# Patient Record
Sex: MALE | Race: WHITE | NOT HISPANIC OR LATINO | Employment: PART TIME | URBAN - NONMETROPOLITAN AREA
[De-identification: names, ages, dates, MRNs, and addresses within clinical notes are randomized per-mention and may not be internally consistent; named-entity substitution may affect disease eponyms.]

---

## 2019-07-31 ENCOUNTER — APPOINTMENT (OUTPATIENT)
Dept: GENERAL RADIOLOGY | Facility: OTHER | Age: 44
End: 2019-07-31
Attending: FAMILY MEDICINE

## 2019-07-31 ENCOUNTER — HOSPITAL ENCOUNTER (EMERGENCY)
Facility: OTHER | Age: 44
Discharge: HOME | End: 2019-07-31
Attending: FAMILY MEDICINE | Admitting: FAMILY MEDICINE

## 2019-07-31 VITALS
SYSTOLIC BLOOD PRESSURE: 133 MMHG | HEIGHT: 72 IN | OXYGEN SATURATION: 98 % | BODY MASS INDEX: 23.03 KG/M2 | HEART RATE: 82 BPM | DIASTOLIC BLOOD PRESSURE: 74 MMHG | WEIGHT: 170 LBS | RESPIRATION RATE: 16 BRPM | TEMPERATURE: 98.1 F

## 2019-07-31 DIAGNOSIS — R07.81 RIB PAIN ON RIGHT SIDE: ICD-10-CM

## 2019-07-31 PROCEDURE — 99283 EMERGENCY DEPT VISIT LOW MDM: CPT | Performed by: FAMILY MEDICINE

## 2019-07-31 PROCEDURE — 71101 X-RAY EXAM UNILAT RIBS/CHEST: CPT | Mod: RT

## 2019-07-31 PROCEDURE — 99283 EMERGENCY DEPT VISIT LOW MDM: CPT | Mod: Z6 | Performed by: FAMILY MEDICINE

## 2019-07-31 PROCEDURE — 25000132 ZZH RX MED GY IP 250 OP 250 PS 637: Performed by: FAMILY MEDICINE

## 2019-07-31 RX ORDER — TRAMADOL HYDROCHLORIDE 50 MG/1
50 TABLET ORAL EVERY 6 HOURS PRN
Qty: 10 TABLET | Refills: 0 | Status: SHIPPED | OUTPATIENT
Start: 2019-07-31 | End: 2019-08-17

## 2019-07-31 RX ORDER — TRAMADOL HYDROCHLORIDE 50 MG/1
50 TABLET ORAL ONCE
Status: COMPLETED | OUTPATIENT
Start: 2019-07-31 | End: 2019-07-31

## 2019-07-31 RX ADMIN — TRAMADOL HYDROCHLORIDE 50 MG: 50 TABLET, FILM COATED ORAL at 22:19

## 2019-07-31 ASSESSMENT — MIFFLIN-ST. JEOR: SCORE: 1694.11

## 2019-07-31 NOTE — ED AVS SNAPSHOT
[facility]  [address]  [place] [address]  Phone:  [phone]  Fax:  [phone]                                    Job Sloan   MRN: [id number]    Department:  [facility]   Date of Visit:  7/31/2019           After Visit Summary Signature Page    I have received my discharge instructions, and my questions have been answered. I have discussed any challenges I see with this plan with the nurse or doctor.    ..........................................................................................................................................  Patient/Patient Representative Signature      ..........................................................................................................................................  Patient Representative Print Name and Relationship to Patient    ..................................................               ................................................  Date                                   Time    ..........................................................................................................................................  Reviewed by Signature/Title    ...................................................              ..............................................  Date                                               Time          22EPIC Rev 08/18

## 2019-08-01 NOTE — ED PROVIDER NOTES
History     Chief Complaint   Patient presents with     Rib Pain     HPI  Job Sloan is a 43 year old male who stretched yesterday and felt a pop in his right anterior ribcage area.  There was also immediate pain.  It just hasn't gotten any better despite time and ibuprofen.  He is most concerned that he broke a rib.      No SOB.    No vomiting or abdominal concerns.    Allergies:  Allergies   Allergen Reactions     Hydrocodone-Acetaminophen Hives and Itching       Problem List:    Patient Active Problem List    Diagnosis Date Noted     Hyperlipidemia with target LDL less than 160 08/11/2015     Priority: Medium     Bilateral carpal tunnel syndrome 08/11/2015     Priority: Medium        Past Medical History:    History reviewed. No pertinent past medical history.    Past Surgical History:    History reviewed. No pertinent surgical history.    Family History:    History reviewed. No pertinent family history.    Social History:  Marital Status:  Single [1]  Social History     Tobacco Use     Smoking status: Current Every Day Smoker     Smokeless tobacco: Never Used   Substance Use Topics     Alcohol use: No     Drug use: No        Medications:      gabapentin (NEURONTIN) 600 MG tablet   traMADol (ULTRAM) 50 MG tablet   gabapentin (GRALISE) 300 MG 24hr Ext Release tab   NAPROXEN PO         Review of Systems no stated fevers, chills, abdominal pain    Physical Exam   BP: 133/74  Pulse: 82  Temp: 98.1  F (36.7  C)  Resp: 16  Height: 182.9 cm (6')  Weight: 77.1 kg (170 lb)  SpO2: 98 %      Physical Exam  Well man.  Stable vitals.  Heart reg.  Lungs clear.  I can press on his mid-clavicular line near rib 7 or 8 and reproduce symptoms.  He likely tore some cartilaginous structures or an intercostal muscular tear.  Highly doubt PE.  abd soft.    Xray shows no pneumothorax or broken rib.    ED Course        Procedures               Critical Care time:  none               Results for orders placed or performed during  the hospital encounter of 07/31/19 (from the past 24 hour(s))   XR Ribs & Chest Right G/E 3 Views    Narrative    PROCEDURE: XR RIBS & CHEST RT 3VW 7/31/2019 8:33 PM    HISTORY: rib pain    COMPARISONS: None.    TECHNIQUE: Chest 1 view, right RIBS 4 views    FINDINGS: Chest: The lungs are clear the heart and pulmonary vessels  are within normal limits. No pneumothorax or pleural effusion is seen.    Right RIBS: There are no right rib fractures or destructive lesions  noted.         Impression    IMPRESSION: Normal chest and right RIBS    ROMA ELLIS MD       Medications   traMADol (ULTRAM) tablet 50 mg (has no administration in time range)       Assessments & Plan (with Medical Decision Making)     I have reviewed the nursing notes.    I have reviewed the findings, diagnosis, plan and need for follow up with the patient.   patient likely tore some ribcage cartilage or an intercostal muscle.  Tramadol given #10 for pain, first dose here in ER.      Recommend returning to clinic if symptoms do not subside in a timely fashion.           Medication List      Started    traMADol 50 MG tablet  Commonly known as:  ULTRAM  50 mg, Oral, EVERY 6 HOURS PRN            Final diagnoses:   Rib pain on right side       7/31/2019   [facility]     Daniel Choi MD  07/31/19 [id number]

## 2019-08-14 ENCOUNTER — HOSPITAL ENCOUNTER (EMERGENCY)
Facility: OTHER | Age: 44
Discharge: HOME | End: 2019-08-14
Attending: PHYSICIAN ASSISTANT | Admitting: PHYSICIAN ASSISTANT

## 2019-08-14 ENCOUNTER — APPOINTMENT (OUTPATIENT)
Dept: GENERAL RADIOLOGY | Facility: OTHER | Age: 44
End: 2019-08-14
Attending: PHYSICIAN ASSISTANT

## 2019-08-14 VITALS
HEIGHT: 72 IN | SYSTOLIC BLOOD PRESSURE: 147 MMHG | TEMPERATURE: 99.5 F | OXYGEN SATURATION: 99 % | DIASTOLIC BLOOD PRESSURE: 90 MMHG | BODY MASS INDEX: 23.03 KG/M2 | WEIGHT: 170 LBS | RESPIRATION RATE: 16 BRPM

## 2019-08-14 DIAGNOSIS — S92.341A DISPLACED FRACTURE OF FOURTH METATARSAL BONE, RIGHT FOOT, INITIAL ENCOUNTER FOR CLOSED FRACTURE: ICD-10-CM

## 2019-08-14 DIAGNOSIS — M79.671 RIGHT FOOT PAIN: ICD-10-CM

## 2019-08-14 PROCEDURE — 99283 EMERGENCY DEPT VISIT LOW MDM: CPT | Mod: Z6 | Performed by: PHYSICIAN ASSISTANT

## 2019-08-14 PROCEDURE — 99284 EMERGENCY DEPT VISIT MOD MDM: CPT | Mod: 25 | Performed by: PHYSICIAN ASSISTANT

## 2019-08-14 PROCEDURE — 73630 X-RAY EXAM OF FOOT: CPT | Mod: TC,RT

## 2019-08-14 RX ORDER — BACLOFEN 20 MG/1
TABLET ORAL
COMMUNITY
End: 2019-11-20

## 2019-08-14 RX ORDER — IBUPROFEN 800 MG/1
800 TABLET, FILM COATED ORAL
COMMUNITY
Start: 2019-04-15 | End: 2019-11-20 | Stop reason: ALTCHOICE

## 2019-08-14 RX ORDER — GABAPENTIN 300 MG/1
CAPSULE ORAL
COMMUNITY
Start: 2017-11-22 | End: 2019-08-14

## 2019-08-14 ASSESSMENT — ENCOUNTER SYMPTOMS
ADENOPATHY: 0
CHILLS: 0
BRUISES/BLEEDS EASILY: 0
SHORTNESS OF BREATH: 0
ABDOMINAL PAIN: 0
HEMATURIA: 0
CONFUSION: 0
WOUND: 0
CHEST TIGHTNESS: 0
FEVER: 0
BACK PAIN: 0

## 2019-08-14 ASSESSMENT — MIFFLIN-ST. JEOR: SCORE: 1694.11

## 2019-08-14 NOTE — LETTER
August 14, 2019      To Whom It May Concern:      Job Sloan was seen in our Emergency Department today, 08/14/19.  He will need to be off work until 8/19/2019 in order to recover from his injuries and establish care with an orthopedic surgeon.    Sincerely,              Danny Leiva PA-C

## 2019-08-14 NOTE — ED AVS SNAPSHOT
[facility]  [address]  [place] [address]  Phone:  [phone]  Fax:  [phone]                                    Job Sloan   MRN: [id number]    Department:  [facility]   Date of Visit:  8/14/2019           After Visit Summary Signature Page    I have received my discharge instructions, and my questions have been answered. I have discussed any challenges I see with this plan with the nurse or doctor.    ..........................................................................................................................................  Patient/Patient Representative Signature      ..........................................................................................................................................  Patient Representative Print Name and Relationship to Patient    ..................................................               ................................................  Date                                   Time    ..........................................................................................................................................  Reviewed by Signature/Title    ...................................................              ..............................................  Date                                               Time          22EPIC Rev 08/18

## 2019-08-15 NOTE — ED TRIAGE NOTES
"Pt arrives to the ED via private car.  Pt states that he was working and jumped down at the 4th step of a ladder and when landing on his right foot he states it sounded like \"knuckles cracking\" but on his foot.  Pt reports this happening around 1800 tonight.  "

## 2019-08-17 ENCOUNTER — APPOINTMENT (OUTPATIENT)
Dept: GENERAL RADIOLOGY | Facility: OTHER | Age: 44
End: 2019-08-17
Attending: FAMILY MEDICINE

## 2019-08-17 ENCOUNTER — HOSPITAL ENCOUNTER (EMERGENCY)
Facility: OTHER | Age: 44
Discharge: HOME | End: 2019-08-17
Attending: PHYSICIAN ASSISTANT | Admitting: PHYSICIAN ASSISTANT

## 2019-08-17 VITALS
RESPIRATION RATE: 16 BRPM | WEIGHT: 170 LBS | TEMPERATURE: 98.1 F | HEART RATE: 68 BPM | HEIGHT: 72 IN | OXYGEN SATURATION: 99 % | SYSTOLIC BLOOD PRESSURE: 129 MMHG | BODY MASS INDEX: 23.03 KG/M2 | DIASTOLIC BLOOD PRESSURE: 82 MMHG

## 2019-08-17 DIAGNOSIS — Z76.0 ENCOUNTER FOR MEDICATION REFILL: ICD-10-CM

## 2019-08-17 DIAGNOSIS — S92.911A: ICD-10-CM

## 2019-08-17 PROCEDURE — 99283 EMERGENCY DEPT VISIT LOW MDM: CPT | Mod: Z6 | Performed by: PHYSICIAN ASSISTANT

## 2019-08-17 PROCEDURE — 99283 EMERGENCY DEPT VISIT LOW MDM: CPT | Mod: 25 | Performed by: PHYSICIAN ASSISTANT

## 2019-08-17 PROCEDURE — 73630 X-RAY EXAM OF FOOT: CPT | Mod: RT

## 2019-08-17 RX ORDER — OXYCODONE HYDROCHLORIDE 5 MG/1
5 TABLET ORAL EVERY 6 HOURS PRN
Qty: 12 TABLET | Refills: 0 | Status: SHIPPED | OUTPATIENT
Start: 2019-08-17 | End: 2019-11-20

## 2019-08-17 ASSESSMENT — MIFFLIN-ST. JEOR: SCORE: 1694.11

## 2019-08-17 NOTE — ED TRIAGE NOTES
"Patient broke foot on Wednesday, ran out of prescribed pain medication. Was told to come in by pharmacy to be seen for medication refill. Patient reporting he doesn't have health insurance right now, unable to see primary care provider until Tuesday at least. \"More painful today than day it happened\"  "

## 2019-08-17 NOTE — LETTER
August 17, 2019      To Whom It May Concern:      Job Sloan was seen in our Emergency Department today, 08/17/19.  I expect his condition to improve over the next 5 days.  He may return to work and perform activity as tolerated when improved.    Sincerely,        SACHIN Ortiz

## 2019-08-17 NOTE — ED AVS SNAPSHOT
[facility]  [address]  [place] [address]  Phone:  [phone]  Fax:  [phone]                                    Job Sloan   MRN: [id number]    Department:  [facility]   Date of Visit:  8/17/2019           After Visit Summary Signature Page    I have received my discharge instructions, and my questions have been answered. I have discussed any challenges I see with this plan with the nurse or doctor.    ..........................................................................................................................................  Patient/Patient Representative Signature      ..........................................................................................................................................  Patient Representative Print Name and Relationship to Patient    ..................................................               ................................................  Date                                   Time    ..........................................................................................................................................  Reviewed by Signature/Title    ...................................................              ..............................................  Date                                               Time          22EPIC Rev 08/18

## 2019-08-18 NOTE — DISCHARGE INSTRUCTIONS
Get plenty of fluids and rest.  He can take Tylenol and ibuprofen as needed for discomfort.  Also use ice and elevation.  Use your crutches strictly nonweightbearing at this time.  Take your prescribed medication as directed.  Call early Monday morning to make an appointment with orthopedics.  Return to the ED if have worsening or concerning symptoms.

## 2019-08-18 NOTE — ED NOTES
Patient discharged to home by self with discharge instructions, note for work and Rx. Patient verbalizes understanding of instructions

## 2019-08-18 NOTE — ED PROVIDER NOTES
History     Chief Complaint   Patient presents with     Medication Refill     HPI  Job Sloan is a 43 year old male who presents to the emergency department with chief complaint of a need for medication refill.  Patient was seen 3 days prior to the emergency department and diagnosed with fractures in his right toes.  He reports he is out of his pain medication that his pain has increased.  However, he has not been following the advice such as staying off of his foot.  There are no new injuries or trauma that is reported.  Patient has no other complaints.    Allergies:  Allergies   Allergen Reactions     Hydrocodone-Acetaminophen Hives and Itching       Problem List:    Patient Active Problem List    Diagnosis Date Noted     Hyperlipidemia with target LDL less than 160 08/11/2015     Priority: Medium     Bilateral carpal tunnel syndrome 08/11/2015     Priority: Medium        Past Medical History:    History reviewed. No pertinent past medical history.    Past Surgical History:    History reviewed. No pertinent surgical history.    Family History:    History reviewed. No pertinent family history.    Social History:  Marital Status:  Single [1]  Social History     Tobacco Use     Smoking status: Current Every Day Smoker     Packs/day: 0.25     Smokeless tobacco: Never Used   Substance Use Topics     Alcohol use: No     Drug use: Yes     Types: Marijuana     Comment: occasional        Medications:      oxyCODONE (ROXICODONE) 5 MG tablet   baclofen (LIORESAL) 20 MG tablet   gabapentin (GRALISE) 300 MG 24hr Ext Release tab   ibuprofen (ADVIL/MOTRIN) 800 MG tablet   Ipratropium-Albuterol (COMBIVENT RESPIMAT)  MCG/ACT inhaler         Review of Systems   Musculoskeletal:        Right foot pain   All other systems reviewed and are negative.      Physical Exam   BP: (!) 147/79  Pulse: 66  Temp: 98.1  F (36.7  C)  Resp: 16  Height: 182.9 cm (6')  Weight: 77.1 kg (170 lb)  SpO2: 99 %      Physical Exam    Constitutional: He appears well-developed and well-nourished. No distress.   HENT:   Head: Normocephalic and atraumatic.   Eyes: Conjunctivae are normal. No scleral icterus.   Neck: Neck supple.   Cardiovascular: Normal rate and regular rhythm.   Pulmonary/Chest: Effort normal and breath sounds normal.   Abdominal: Soft. There is no tenderness.   Musculoskeletal: He exhibits no deformity.   Bruising tenderness to palpation of right toes and foot   Lymphadenopathy:     He has no cervical adenopathy.   Neurological: He is alert.   Skin: Skin is warm and dry. No rash noted. He is not diaphoretic.   Psychiatric: He has a normal mood and affect.       ED Course        Procedures               Critical Care time:  none               Results for orders placed or performed during the hospital encounter of 08/17/19 (from the past 24 hour(s))   XR Foot Right G/E 3 Views    Narrative    Exam: XR FOOT RT G/E 3 VW     History:Male, age 43 years, fracture    Comparison:  8/14/2019    Technique: Three views are submitted.    Findings: Bones are normally mineralized. Curvilinear lucency seen  previous in the base of the fourth metatarsal is similar in  appearance. No new fracture. No dislocation.           Impression    Impression:  Curvilinear lucency again seen at the base of the fourth metatarsal  suggesting a mildly displaced intra-articular fracture extending into  the fourth tarsometatarsal joint as well as the articulation between  the third and fourth metatarsals. No significant change in the overall  orientation or position of the bony structures.    VAN CALLEJAS MD       Medications - No data to display    Assessments & Plan (with Medical Decision Making)   Patient is nontoxic-appearing in slight distress due to discomfort.  Bruising and swelling to right foot toes.  X-ray showed Curvilinear lucency again seen at the base of the fourth metatarsal  suggesting a mildly displaced intra-articular fracture extending  into  the fourth tarsometatarsal joint as well as the articulation between  the third and fourth metatarsals. No significant change in the overall  orientation or position of the bony structures.    Discussed findings with the patient, we will extend the short course of his pain medication and referral was placed for him to follow-up with orthopedics.  I did let him know that we would not be providing any more pain medication from the emergency room for this current problem.  However, if symptoms are worsening he should return the emergency department for further evaluation.  He understands and agrees with plan and patient is discharged.    Fili Mcmahon PA-C        I have reviewed the nursing notes.    I have reviewed the findings, diagnosis, plan and need for follow up with the patient.       Discharge Medication List as of 8/17/2019  7:15 PM      START taking these medications    Details   oxyCODONE (ROXICODONE) 5 MG tablet Take 1 tablet (5 mg) by mouth every 6 hours as needed for pain, Disp-12 tablet, R-0, Local Print             Final diagnoses:   Fracture of multiple toes, right, closed, initial encounter   Encounter for medication refill       8/17/2019   [facility]     Fili Mcmahon PA  08/17/19 [id number]

## 2019-08-22 ENCOUNTER — OFFICE VISIT (OUTPATIENT)
Dept: ORTHOPEDICS | Facility: OTHER | Age: 44
End: 2019-08-22
Attending: PODIATRIST

## 2019-08-22 DIAGNOSIS — S99.921A INJURY OF RIGHT FOOT, INITIAL ENCOUNTER: Primary | ICD-10-CM

## 2019-08-22 PROCEDURE — G0463 HOSPITAL OUTPT CLINIC VISIT: HCPCS

## 2019-08-28 NOTE — PROGRESS NOTES
VITALS:   Height:   72  Weight:   170    CHIEF COMPLAINT: Right Foot Fracture    PROBLEMS:   Patient has no noted problems.    PATIENT REPORTED MEDICATIONS:  GABAPENTIN CAPSULE   OXYCODONE HCL TABLET   PERCOCET 5-325 MG ORAL TABLET (OXYCODONE-ACETAMINOPHEN) Take 1 tablets by mouth every 8 hours as needed for pain; Route: ORAL    PATIENT REPORTED ALLERGIES:  VICODIN (SevereReaction: No reaction details noted)    RISK FACTORS:  Tobacco use:   current every day smoker  Passive smoke exposure: No  Alcohol Use:   No    HISTORY OF PRESENT ILLNESS:    The patient is a 43-year-old male who is here today with a broken right foot.  He jumped out of his truck and heard a loud snap.  X-rays showed a fourth metatarsal fracture.  He has severe pain.  He rates it 9/10.  He also has some knee pain that was not looked into originally, but feels unstable.  This happened a week ago.  He has pins and needles, and again severe pain.  He does request some Percocet.    The patient's health history form dated 08/22/2019 was reviewed and signed.  Past medical history, surgical history, social history, family history, and review of systems noted.    PAST MEDICAL HISTORY:    Arthritis    PAST ORTHOPEDIC SURGICAL HISTORY:    Back    PAST SURGICAL HISTORY:    No Past Surgical History    FAMILY HISTORY:    Father:  Diabetes  Mother:  Back Surgery  Brother:  Neck Surgery  Sister:  Back Surgery    SOCIAL HISTORY:   Occupation:  [employer]  Marital Status:  Single  Alcohol Use:  No  Tobacco Use:  Yes, Does Not Want To Quit  Secondhand Smoke Exposure:  No  History of HIV:  No  History of Hepatitis:  No    REVIEW OF SYSTEMS:  Joint or Muscle pain: Yes  Stiffness:  Yes  Swelling:  Yes  Difficulty in walking: Yes  Cold extremities: Yes  Weakness of muscles: Yes  Rash or Itching: No  Bruising:  Yes  Numbness/Tingling: Yes    PHYSICAL EXAMINATION:    CONSTITUTIONAL:  Patient is alert and oriented x3, well-appearing and in no apparent distress.  Affect is  pleasant and answers questions appropriately.  VASCULAR:  Circulation is intact with palpable pedal pulses and has adequate capillary fill time.  NEUROLOGIC:  Light touch sensation is intact to digits.  INTEGUMENT:  No dermatologic lesions are noted.  Skin with normal texture and turgor.  MUSCULOSKELETAL:  Significant swelling and bruising.  Diffuse pain to the midfoot.  Unable to stress the Lisfranc joint.  Very tender to palpation again diffusely across the midfoot.  No open lesions associated with this injury.    X-RAY:  I did review x-rays.  The most recent x-rays do show a displaced right fourth metatarsal fracture.  No obvious malalignment to the midfoot.    ASSESSMENT:    Right fourth metatarsal fracture, possible Lisfranc injury given swelling, edema and diffuseness of symptoms.    PLAN:   We will get a CT scan to workup the injury further.  I will call with the results and schedule something as need be.  I also recommended having his knee looked at.  He will get lined up with one of my partners to have this evaluated.    Dictated by Michael Shaw DPM  cc:  Dr. Shaw at [facility]    D:  08/22/2019  T:  08/26/2019    Typed and/or reviewed and corrected by signing  below, and sent to the Physician for final review and signature.    This report was created using voice recording software and computer-generated templates. Although every effort has been made to review for and eliminate errors, some errors may still occur.

## 2019-10-03 DIAGNOSIS — M25.561 RIGHT KNEE PAIN, UNSPECIFIED CHRONICITY: Primary | ICD-10-CM

## 2019-10-07 ENCOUNTER — HOSPITAL ENCOUNTER (OUTPATIENT)
Dept: GENERAL RADIOLOGY | Facility: OTHER | Age: 44
Discharge: HOME | End: 2019-10-07
Attending: ORTHOPAEDIC SURGERY | Admitting: ORTHOPAEDIC SURGERY

## 2019-10-07 ENCOUNTER — OFFICE VISIT (OUTPATIENT)
Dept: ORTHOPEDICS | Facility: OTHER | Age: 44
End: 2019-10-07
Attending: ORTHOPAEDIC SURGERY

## 2019-10-07 DIAGNOSIS — M25.561 RIGHT KNEE PAIN, UNSPECIFIED CHRONICITY: Primary | ICD-10-CM

## 2019-10-07 DIAGNOSIS — M25.561 RIGHT KNEE PAIN, UNSPECIFIED CHRONICITY: ICD-10-CM

## 2019-10-07 PROCEDURE — G0463 HOSPITAL OUTPT CLINIC VISIT: HCPCS | Performed by: ORTHOPAEDIC SURGERY

## 2019-10-07 PROCEDURE — 73560 X-RAY EXAM OF KNEE 1 OR 2: CPT | Mod: LT

## 2019-10-14 NOTE — PROGRESS NOTES
VITALS:   Height:   72  Weight:   175  Pulse rate:  68  Blood Pressure:  132 / 86    CHIEF COMPLAINT: Right Foot/Right Knee Injury    PROBLEMS:   Patient has no noted problems.    PATIENT REPORTED MEDICATIONS:  IBUPROFEN TABLET (IBUPROFEN TABS)   GABAPENTIN CAPSULE   OXYCODONE HCL TABLET   PERCOCET 5-325 MG ORAL TABLET (OXYCODONE-ACETAMINOPHEN) Take 1 tablets by mouth every 8 hours as needed for pain; Route: ORAL    PATIENT REPORTED ALLERGIES:  VICODIN (SevereReaction: No reaction details noted)    RISK FACTORS:  Tobacco use:   current every day smoker    HISTORY OF PRESENT ILLNESS:    This is a 43-year-old gentleman who was jumping off of a trailer, trying to slide off the roof of a trailer.  He only fell about 5 feet but landed funny on his right foot and his right knee and had immediate pain.  This was about a month and a half ago.  It bothers him whenever he walks.  He cannot stand it at this point and he wants something done with it.  Not moving or bending makes it better.  He has been in a cam walker boot for about a month and a half now but has not had anything done with the right knee.    Complete review of systems is negative other than the history of present illness and past medical history. The patient's health history form dated 10/07/19 was reviewed and signed, scanned into EMR.      PAST MEDICAL HISTORY:    Arthritis    PAST ORTHOPEDIC SURGICAL HISTORY:    Back x3  Carpal Tunnel    PAST SURGICAL HISTORY:    No Past Surgical History    FAMILY HISTORY:    Father:  Diabetes  Mother:  Back Surgery  Brother:  Neck Surgery  Sister:  Back Surgery    SOCIAL HISTORY:   Occupation:  [employer]  Marital Status:  Single  Alcohol Use:  No  Tobacco Use:  Yes, Does Not Want To Quit  Secondhand Smoke Exposure:  No  History of HIV:  No  History of Hepatitis:  No    REVIEW OF SYSTEMS:  Joint or Muscle pain: Yes  Stiffness:  Yes  Swelling:  Yes  Difficulty in walking: Yes  Cold extremities: Yes  Weakness of  muscles: No  Bruising:  Yes  Numbness/Tingling: Yes    PHYSICAL EXAMINATION:    General:  This is a 43-year-old gentleman.  The patient is alert and oriented x3, in no acute distress, very pleasant cooperative during the physical exam with a normal mood and affect.    Skin:  Intact over the right ankle; no erythema, warmth, rashes or bruising.    Cardiovascular:  Normal capillary refill of the right lower extremity with a palpable dorsalis pedis pulse.  No evidence of lower extremity DVT.    Neurologic:  Normal sensation and motor function in the superficial peroneal, deep peroneal and tibial nerve distributions.    Musculoskeletal:  He still has some swelling over the right foot and on the dorsum of the right foot.  He has no significant swelling in the right knee.  There is no effusion really appreciated here.  He has full range of motion with full extension and flexion to 90 degrees.  He is stable to varus and valgus stress.  He is tender to palpation at the medial joint line and has an equivocal Dave's sign.  Negative Lachman's sign.     X-RAY:  X-ray examination of the right knee is largely normal today.  There does appear to be the possibility of an osteochondral fragment noted that appears to be fairly old noted within the intercondylar notch on the posterior of the knee on the right.     X-ray examination of the right foot shows a fracture of the fourth metatarsal base that is intra-articular but largely nondisplaced.  It has largely healed at this point.     ASSESSMENT:    A 43-year-old gentleman with a fourth metatarsal base fracture on the right foot.  He also has the possibility of an internal derangement of the right knee.    PLAN:   We are going to obtain an MRI of the right knee looking specifically for a medial meniscus tear and I will call him with the results.  I did advise him to take off the boot and start walking on his foot at this point or possibly just work his way out of the boot and  into regular shoes.     Dictated by Cruz Hahn MD     D:  10/07/19  T:  10/11/19     Typed and/or reviewed and corrected by signing  below, and sent to the Physician for final review and signature.     This report was created using voice recording software and computer-generated templates. Although every effort has been made to review for and eliminate errors, some errors may still occur.

## 2019-11-01 ENCOUNTER — HOSPITAL ENCOUNTER (OUTPATIENT)
Dept: MRI IMAGING | Facility: OTHER | Age: 44
Discharge: HOME | End: 2019-11-01
Attending: ORTHOPAEDIC SURGERY | Admitting: ORTHOPAEDIC SURGERY
Payer: COMMERCIAL

## 2019-11-01 DIAGNOSIS — M25.561 RIGHT KNEE PAIN, UNSPECIFIED CHRONICITY: ICD-10-CM

## 2019-11-01 PROCEDURE — 73721 MRI JNT OF LWR EXTRE W/O DYE: CPT | Mod: RT

## 2019-11-04 ENCOUNTER — OFFICE VISIT (OUTPATIENT)
Dept: ORTHOPEDICS | Facility: OTHER | Age: 44
End: 2019-11-04
Attending: ORTHOPAEDIC SURGERY
Payer: COMMERCIAL

## 2019-11-04 DIAGNOSIS — M25.561 RIGHT KNEE PAIN, UNSPECIFIED CHRONICITY: Primary | ICD-10-CM

## 2019-11-04 PROCEDURE — G0463 HOSPITAL OUTPT CLINIC VISIT: HCPCS

## 2019-11-08 NOTE — PROGRESS NOTES
CHIEF COMPLAINT: Right Knee & Right Foot Injury Recheck    PROBLEMS:   Patient has no noted problems.    PATIENT REPORTED MEDICATIONS:  IBUPROFEN TABLET (IBUPROFEN TABS)   GABAPENTIN CAPSULE   OXYCODONE HCL TABLET   PERCOCET 5-325 MG ORAL TABLET (OXYCODONE-ACETAMINOPHEN) Take 1 tablets by mouth every 8 hours as needed for pain; Route: ORAL    PATIENT REPORTED ALLERGIES:  VICODIN (SevereReaction: No reaction details noted)      HISTORY OF PRESENT ILLNESS:    The patient is a 43-year-old gentleman with knee pain in the right knee resultant from jumping off of a trailer.   We obtained an MRI of his knee.   I was unable to really get a hold of him so he comes in today for the results of that.       PAST MEDICAL HISTORY:    Arthritis    PAST ORTHOPEDIC SURGICAL HISTORY:    Back x3  Carpal Tunnel    PAST SURGICAL HISTORY:    No Past Surgical History    FAMILY HISTORY:    Father:  Diabetes  Mother:  Back Surgery  Brother:  Neck Surgery  Sister:  Back Surgery    SOCIAL HISTORY:   Occupation:  [email or web address]  Marital Status:  Single  Alcohol Use:  No  Tobacco Use:  Yes, Does Not Want To Quit  Secondhand Smoke Exposure:  No  History of HIV:  No  History of Hepatitis:  No    PHYSICAL EXAMINATION:    On examination the patient has significant tenderness to palpation of the medial compartment of the knee and the medial patellar facet as well.  He is otherwise neuro and vascularly intact and stable to varus and valgus stress.    MRI:  Review of the MRI scan shows medial meniscus posterior horn and body tear.   There is also tricompartmental degenerative changes within the knee and a significant effusion on the knee as well.    ASSESSMENT:    This is a 43-year-old gentleman with mild knee arthritis and a medial meniscus tear of his right knee.     PLAN:   The patient would like to have this definitively treated as he is not able to do what he needs to do during the day.   We are going to go ahead and line him up with a knee arthroscopy to  the right knee.  All of the risks and benefits of surgical intervention were discussed with him and he wishes to proceed.    Dictated by Cruz Hahn M.D.  D:  11/04/19  T:   11/07/19    Typed and/or reviewed and corrected by signing  below, and sent to the Physician for final review and signature.     This report was created using voice recording software and computer-generated templates. Although every effort has been made to review for and eliminate errors, some errors may still occur.     Electronically signed by Aislinn Ayala  on 11/07/2019 at 1:36 PM    ________________________________________________________________________

## 2019-11-20 ENCOUNTER — OFFICE VISIT (OUTPATIENT)
Dept: FAMILY MEDICINE | Facility: OTHER | Age: 44
End: 2019-11-20
Attending: FAMILY MEDICINE
Payer: COMMERCIAL

## 2019-11-20 VITALS
WEIGHT: 176.2 LBS | RESPIRATION RATE: 20 BRPM | HEART RATE: 73 BPM | TEMPERATURE: 97.9 F | DIASTOLIC BLOOD PRESSURE: 80 MMHG | SYSTOLIC BLOOD PRESSURE: 110 MMHG | BODY MASS INDEX: 24.67 KG/M2 | OXYGEN SATURATION: 99 % | HEIGHT: 71 IN

## 2019-11-20 DIAGNOSIS — Z98.890 HISTORY OF LUMBAR SURGERY: ICD-10-CM

## 2019-11-20 DIAGNOSIS — J45.20 MILD INTERMITTENT ASTHMA WITHOUT COMPLICATION: ICD-10-CM

## 2019-11-20 DIAGNOSIS — Z72.0 TOBACCO ABUSE: ICD-10-CM

## 2019-11-20 DIAGNOSIS — Z01.818 PRE-OPERATIVE EXAMINATION: Primary | ICD-10-CM

## 2019-11-20 DIAGNOSIS — S83.231S COMPLEX TEAR OF MEDIAL MENISCUS OF RIGHT KNEE AS CURRENT INJURY, SEQUELA: ICD-10-CM

## 2019-11-20 PROBLEM — S83.221A PERIPHERAL TEAR OF MEDIAL MENISCUS OF RIGHT KNEE AS CURRENT INJURY: Status: RESOLVED | Noted: 2019-11-20 | Resolved: 2019-11-20

## 2019-11-20 PROBLEM — S83.231A COMPLEX TEAR OF MEDIAL MENISCUS OF RIGHT KNEE AS CURRENT INJURY: Status: ACTIVE | Noted: 2019-11-20

## 2019-11-20 PROBLEM — S83.221A PERIPHERAL TEAR OF MEDIAL MENISCUS OF RIGHT KNEE AS CURRENT INJURY: Status: ACTIVE | Noted: 2019-11-20

## 2019-11-20 LAB
ALBUMIN SERPL-MCNC: 4.5 G/DL (ref 3.5–5.7)
ALP SERPL-CCNC: 57 U/L (ref 34–104)
ALT SERPL W P-5'-P-CCNC: 17 U/L (ref 7–52)
ANION GAP SERPL CALCULATED.3IONS-SCNC: 4 MMOL/L (ref 3–14)
AST SERPL W P-5'-P-CCNC: 15 U/L (ref 13–39)
BILIRUB SERPL-MCNC: 0.6 MG/DL (ref 0.3–1)
BUN SERPL-MCNC: 12 MG/DL (ref 7–25)
CALCIUM SERPL-MCNC: 9.4 MG/DL (ref 8.6–10.3)
CHLORIDE SERPL-SCNC: 105 MMOL/L (ref 98–107)
CO2 SERPL-SCNC: 29 MMOL/L (ref 21–31)
CREAT SERPL-MCNC: 1.12 MG/DL (ref 0.7–1.3)
ERYTHROCYTE [DISTWIDTH] IN BLOOD BY AUTOMATED COUNT: 13.3 % (ref 10–15)
GFR SERPL CREATININE-BSD FRML MDRD: 71 ML/MIN/{1.73_M2}
GLUCOSE SERPL-MCNC: 117 MG/DL (ref 70–105)
HCT VFR BLD AUTO: 47.3 % (ref 40–53)
HGB BLD-MCNC: 15.9 G/DL (ref 13.3–17.7)
MCH RBC QN AUTO: 30.6 PG (ref 26.5–33)
MCHC RBC AUTO-ENTMCNC: 33.6 G/DL (ref 31.5–36.5)
MCV RBC AUTO: 91 FL (ref 78–100)
PLATELET # BLD AUTO: 269 10E9/L (ref 150–450)
POTASSIUM SERPL-SCNC: 4 MMOL/L (ref 3.5–5.1)
PROT SERPL-MCNC: 7.3 G/DL (ref 6.4–8.9)
RBC # BLD AUTO: 5.19 10E12/L (ref 4.4–5.9)
SODIUM SERPL-SCNC: 138 MMOL/L (ref 134–144)
WBC # BLD AUTO: 9.4 10E9/L (ref 4–11)

## 2019-11-20 PROCEDURE — 36415 COLL VENOUS BLD VENIPUNCTURE: CPT | Mod: ZL | Performed by: FAMILY MEDICINE

## 2019-11-20 PROCEDURE — 99215 OFFICE O/P EST HI 40 MIN: CPT | Performed by: FAMILY MEDICINE

## 2019-11-20 PROCEDURE — 80053 COMPREHEN METABOLIC PANEL: CPT | Mod: ZL | Performed by: FAMILY MEDICINE

## 2019-11-20 PROCEDURE — 85027 COMPLETE CBC AUTOMATED: CPT | Mod: ZL | Performed by: FAMILY MEDICINE

## 2019-11-20 PROCEDURE — G0463 HOSPITAL OUTPT CLINIC VISIT: HCPCS

## 2019-11-20 RX ORDER — OXYCODONE HYDROCHLORIDE 5 MG/1
5 TABLET ORAL EVERY 6 HOURS PRN
Qty: 45 TABLET | Refills: 0 | Status: SHIPPED | OUTPATIENT
Start: 2019-11-20 | End: 2019-12-16

## 2019-11-20 ASSESSMENT — PAIN SCALES - GENERAL: PAINLEVEL: SEVERE PAIN (6)

## 2019-11-20 ASSESSMENT — MIFFLIN-ST. JEOR: SCORE: 1713.62

## 2019-11-20 NOTE — PROGRESS NOTES
Advised to slowly cut down.  And patient arrives here for preop.  ----------------- PREOPERATIVE EXAM ------------------  11/20/2019    SUBJECTIVE:  Job Sloan is a 43 year old male here for preoperative optimization.    I was asked to see Job Sloan by Dr. Collazo for preoperative evaluation    Date of Surgery: 12/06  Type of Surgery: rt knee arthroscopy  Hospital:  [facility]    HPI:  *Patient arrives here for preop.  Patient indicates that in September he had jumped off a building up.  And landed.  He twisted his knee.  He subsequently fractured his foot which was casted.  But also sustained a medial meniscal tear to his knee.  He is scheduled for arthroscopy December 6.  He reports his knee feels like it is popping out.  He is having pain swelling.      Fever/Chills or other infectious symptoms in past month:  no  >10lb weight loss in past two months:  No  Nursing Notes:   Tia Sullivan LPN  11/20/2019  2:16 PM  Signed  Chief Complaint   Patient presents with     Pre-Op Exam     right knee   Date of Surgery: 12/06/2019    Type of Surgery: Right Knee exploration possible meniscus   Surgeon: Dr. Hahn   Hospital:  [facility]   Fax:      Fever/Chills or other infectious symptoms in past month: no  >10lb weight loss in past two months: no  O2 SAT: 99    Health Care Directive/Code status:     Hx of blood transfusions:   no  Td up to date:  yes  History of VRE/MRSA:  no Date:     Preoperative Evaluation: Obstructive Sleep Apnea screening    S: Snore -  Do you snore loudly? (louder than talking or loud enough to be heard through closed doors)no  T: Tired - Do you often feel tired, fatigued, or sleepy during the daytime?no  O: Observed - Has anyone ever observed you stop breathing during your sleep? Yes had sleep apnea Pt does not remember the last time it happened.  Does not have a CPap  P: Pressure - Do you have or are you being treated for high blood pressure?no  B: BMI - BMI greater than 35kg/m2?no  A: Age -  "Age over 50 years old?no  N: Neck - Neck circumference greater than 40 cm?no  G: Gender - Gender: Male?yes    Total number of \"YES\" responses:  2    Scoring: Low risk of JORGE 0-2  At Risk of JORGE: >3 High Risk of JORGE: 5-8    Tia Sullivan LPN 11/20/2019 2:03 PM      Initial /80   Pulse 73   Temp 97.9  F (36.6  C) (Tympanic)   Resp 20   Ht 1.815 m (5' 11.46\")   Wt 79.9 kg (176 lb 3.2 oz)   SpO2 99%   BMI 24.26 kg/m    Estimated body mass index is 24.26 kg/m  as calculated from the following:    Height as of this encounter: 1.815 m (5' 11.46\").    Weight as of this encounter: 79.9 kg (176 lb 3.2 oz).  Medication Reconciliation: complete    Tia Sullivan LPN      Patient Active Problem List    Diagnosis Date Noted     Complex tear of medial meniscus of right knee as current injury 11/20/2019     Priority: Medium     History of lumbar surgery times 3 11/20/2019     Priority: Medium     Mild intermittent asthma without complication 11/20/2019     Priority: Medium     Hyperlipidemia with target LDL less than 160 08/11/2015     Priority: Medium     Bilateral carpal tunnel syndrome 08/11/2015     Priority: Medium       No past medical history on file.    No past surgical history on file.    No family history on file.    Social History     Tobacco Use     Smoking status: Current Every Day Smoker     Packs/day: 0.25     Smokeless tobacco: Never Used   Substance Use Topics     Alcohol use: No     Drug use: Yes     Types: Marijuana     Comment: occasional       Current Outpatient Medications   Medication Sig Dispense Refill     gabapentin (GRALISE) 300 MG 24hr Ext Release tab Take 300 mg by mouth 2 times daily       Ipratropium-Albuterol (COMBIVENT RESPIMAT)  MCG/ACT inhaler Inhale 1 puff into the lungs         Allergies:  Allergies   Allergen Reactions     Hydrocodone-Acetaminophen Hives and Itching       ROS:    Surgical:  patient denies previous complications from prior surgeries including but not " "limited to prolonged bleeding, anesthesia complications, dysrhythmias, surgical wound infections, or prolonged hospital stay.    Denies family hx of bleeding tendencies, anesthesia complications, or other problems with surgery.    Review of systems is positive for chest pain.  He indicates that has been going on for years.  Mainly located in the pectoral muscles.  Usually associated with anxiety.  Patient also reports history of asthma and shortness of breath.  This is been stable.  He reports calf pain with walking but attributes it to back surgeries.  No changes in bowel or bladder habits.     -------------------------------------------------------------    PHYSICAL EXAM:  /80   Pulse 73   Temp 97.9  F (36.6  C) (Tympanic)   Resp 20   Ht 1.815 m (5' 11.46\")   Wt 79.9 kg (176 lb 3.2 oz)   SpO2 99%   BMI 24.26 kg/m      EXAM:  General Appearance: Pleasant, alert, appropriate appearance for age. No acute distress  Head Exam: Normal. Normocephalic, atraumatic.  Eyes: PERRL, EOMI  Ears: Normal TM's bilaterally. Normal auditory canals and external ears.   OroPharynx: Normal buccal mucosa. Normal pharynx.  Neck: Supple, no masses or nodes, no lymphadenopathy.  No thyromegaly.  Lungs: Normal chest wall and respirations. Clear to auscultation, no wheezes or crackles.  Cardiovascular: Regular rate and rhythm. S1, S2, no murmurs.  Gastrointestinal: Soft, nontender, no abnormal masses or organomegaly. BS normal   Musculoskeletal: No edema.  Slight amount of joint effusion present in his right knee.  Medial joint tenderness.  Skin: no concerning or new rashes.  Psychiatric Exam: Alert and oriented, appropriate affect.        ---------------------------------------------------------------  LABS  Results for orders placed or performed in visit on 11/20/19   CBC W PLT No Diff     Status: None   Result Value Ref Range    WBC 9.4 4.0 - 11.0 10e9/L    RBC Count 5.19 4.4 - 5.9 10e12/L    Hemoglobin 15.9 13.3 - 17.7 g/dL "    Hematocrit 47.3 40.0 - 53.0 %    MCV 91 78 - 100 fl    MCH 30.6 26.5 - 33.0 pg    MCHC 33.6 31.5 - 36.5 g/dL    RDW 13.3 10.0 - 15.0 %    Platelet Count 269 150 - 450 10e9/L   Comprehensive Metabolic Panel     Status: Abnormal   Result Value Ref Range    Sodium 138 134 - 144 mmol/L    Potassium 4.0 3.5 - 5.1 mmol/L    Chloride 105 98 - 107 mmol/L    Carbon Dioxide 29 21 - 31 mmol/L    Anion Gap 4 3 - 14 mmol/L    Glucose 117 (H) 70 - 105 mg/dL    Urea Nitrogen 12 7 - 25 mg/dL    Creatinine 1.12 0.70 - 1.30 mg/dL    GFR Estimate 71 >60 mL/min/[1.73_m2]    GFR Estimate If Black 86 >60 mL/min/[1.73_m2]    Calcium 9.4 8.6 - 10.3 mg/dL    Bilirubin Total 0.6 0.3 - 1.0 mg/dL    Albumin 4.5 3.5 - 5.7 g/dL    Protein Total 7.3 6.4 - 8.9 g/dL    Alkaline Phosphatase 57 34 - 104 U/L    ALT 17 7 - 52 U/L    AST 15 13 - 39 U/L       ASSESSEMENT AND PLAN:    (Z01.818) Pre-operative examination  (primary encounter diagnosis)  Patient is medically cleared to proceed with surgery    (S83.231S) Complex tear of medial meniscus of right knee as current injury, sequela      (Z98.890) History of lumbar surgery  Stable    (J45.20) Mild intermittent asthma without complication  Currently lungs are clear.  Stable      PRE OP RECOMMENDATIONS:  Patient is on chronic pain medications oxycodone refilled advised to slowly cut down and stop 4 to 5 days prior to surgery  Patient is on antiplatlet/anticoagulation medication asa  Other medications that need adjustment perioperatively stop asa  One week prior to surg     Other:  Patient was advised to call our office and the surgical services with any change in condition or new symptoms if they were to develop between today and their surgical date.  Especially any cardiopulmonary symptoms or symptoms concerning for an infection.    Bienvenido Cintron MD 11/20/2019

## 2019-11-20 NOTE — LETTER
November 22, 2019      Job Sloan  [address]  [place] [address]        Dear ,    We are writing to inform you of your test results.    Your test results fall within the expected range(s) or remain unchanged from previous results.  Please continue with current treatment plan.    Resulted Orders   CBC W PLT No Diff   Result Value Ref Range    WBC 9.4 4.0 - 11.0 10e9/L    RBC Count 5.19 4.4 - 5.9 10e12/L    Hemoglobin 15.9 13.3 - 17.7 g/dL    Hematocrit 47.3 40.0 - 53.0 %    MCV 91 78 - 100 fl    MCH 30.6 26.5 - 33.0 pg    MCHC 33.6 31.5 - 36.5 g/dL    RDW 13.3 10.0 - 15.0 %    Platelet Count 269 150 - 450 10e9/L   Comprehensive Metabolic Panel   Result Value Ref Range    Sodium 138 134 - 144 mmol/L    Potassium 4.0 3.5 - 5.1 mmol/L    Chloride 105 98 - 107 mmol/L    Carbon Dioxide 29 21 - 31 mmol/L    Anion Gap 4 3 - 14 mmol/L    Glucose 117 (H) 70 - 105 mg/dL    Urea Nitrogen 12 7 - 25 mg/dL    Creatinine 1.12 0.70 - 1.30 mg/dL    GFR Estimate 71 >60 mL/min/[1.73_m2]    GFR Estimate If Black 86 >60 mL/min/[1.73_m2]    Calcium 9.4 8.6 - 10.3 mg/dL    Bilirubin Total 0.6 0.3 - 1.0 mg/dL    Albumin 4.5 3.5 - 5.7 g/dL    Protein Total 7.3 6.4 - 8.9 g/dL    Alkaline Phosphatase 57 34 - 104 U/L    ALT 17 7 - 52 U/L    AST 15 13 - 39 U/L       If you have any questions or concerns, please call the clinic at the number listed above.       Sincerely,        Bienvenido Cintron MD

## 2019-11-20 NOTE — NURSING NOTE
"Chief Complaint   Patient presents with     Pre-Op Exam     right knee   Date of Surgery: 12/06/2019    Type of Surgery: Right Knee exploration possible meniscus   Surgeon: Dr. Hahn   Hospital:  [facility]   Fax:      Fever/Chills or other infectious symptoms in past month: no  >10lb weight loss in past two months: no  O2 SAT: 99    Health Care Directive/Code status:     Hx of blood transfusions:   no  Td up to date:  yes  History of VRE/MRSA:  no Date:     Preoperative Evaluation: Obstructive Sleep Apnea screening    S: Snore -  Do you snore loudly? (louder than talking or loud enough to be heard through closed doors)no  T: Tired - Do you often feel tired, fatigued, or sleepy during the daytime?no  O: Observed - Has anyone ever observed you stop breathing during your sleep? Yes had sleep apnea Pt does not remember the last time it happened.  Does not have a CPap  P: Pressure - Do you have or are you being treated for high blood pressure?no  B: BMI - BMI greater than 35kg/m2?no  A: Age - Age over 50 years old?no  N: Neck - Neck circumference greater than 40 cm?no  G: Gender - Gender: Male?yes    Total number of \"YES\" responses:  2    Scoring: Low risk of JORGE 0-2  At Risk of JORGE: >3 High Risk of JORGE: 5-8    Tia Sullivan LPN 11/20/2019 2:03 PM      Initial /80   Pulse 73   Temp 97.9  F (36.6  C) (Tympanic)   Resp 20   Ht 1.815 m (5' 11.46\")   Wt 79.9 kg (176 lb 3.2 oz)   SpO2 99%   BMI 24.26 kg/m   Estimated body mass index is 24.26 kg/m  as calculated from the following:    Height as of this encounter: 1.815 m (5' 11.46\").    Weight as of this encounter: 79.9 kg (176 lb 3.2 oz).  Medication Reconciliation: complete    Tia Sullivan LPN  "

## 2019-12-09 DIAGNOSIS — S83.231S COMPLEX TEAR OF MEDIAL MENISCUS OF RIGHT KNEE AS CURRENT INJURY, SEQUELA: ICD-10-CM

## 2019-12-12 RX ORDER — OXYCODONE HYDROCHLORIDE 5 MG/1
TABLET ORAL
Qty: 45 TABLET | OUTPATIENT
Start: 2019-12-12

## 2019-12-12 NOTE — TELEPHONE ENCOUNTER
[facility] sent Rx request for the following:      OXYCODONE HCL 5 MG TABLET  Sig: TAKE 1 TABLET BY MOUTH EVERY 6 HOURS AS NEEDED FOR PAIN.  Last Prescription Date:   11/20/19  Last Fill Qty/Refills:         45, R-0    Last Office Visit:              11/20/19 (pre-op- Dr. Cintron)     Drug not on the [facility], [facility] or [facility] refill protocol or controlled substance    Per LOV Note:    Date of Surgery: 12/06/2019       Type of Surgery: Right Knee exploration possible meniscus     Surgeon: Dr. Hahn     Hospital:  [facility]     Patient is on chronic pain medications oxycodone refilled advised to slowly cut down and stop 4 to 5 days prior to surgery    Called and spoke to Patient after verifying last name and date of birth. Pt confirmed surgery. Pt informed of need to be seen face to face for refill consideration and was transferred to scheduling line, to set up appointment with Dr. Cintron:    Dec 16, 2019  4:30 PM CST  SHORT with Bienvenido Cintron MD  [facility] ([facility]) [address]  [place] [id number] [phone]        Refill request refused, with note to pharmacy. Unable to complete prescription refill per RN Medication Refill Policy. Judy Walker RN .............. 12/12/2019  11:32 AM

## 2019-12-16 ENCOUNTER — OFFICE VISIT (OUTPATIENT)
Dept: ORTHOPEDICS | Facility: OTHER | Age: 44
End: 2019-12-16
Attending: ORTHOPAEDIC SURGERY
Payer: COMMERCIAL

## 2019-12-16 ENCOUNTER — OFFICE VISIT (OUTPATIENT)
Dept: FAMILY MEDICINE | Facility: OTHER | Age: 44
End: 2019-12-16
Attending: FAMILY MEDICINE
Payer: COMMERCIAL

## 2019-12-16 VITALS
HEART RATE: 77 BPM | RESPIRATION RATE: 12 BRPM | OXYGEN SATURATION: 93 % | DIASTOLIC BLOOD PRESSURE: 80 MMHG | HEIGHT: 72 IN | WEIGHT: 183 LBS | TEMPERATURE: 97.3 F | SYSTOLIC BLOOD PRESSURE: 120 MMHG | BODY MASS INDEX: 24.79 KG/M2

## 2019-12-16 DIAGNOSIS — M19.041 PRIMARY OSTEOARTHRITIS OF BOTH HANDS: ICD-10-CM

## 2019-12-16 DIAGNOSIS — M54.2 NECK PAIN: ICD-10-CM

## 2019-12-16 DIAGNOSIS — M19.042 PRIMARY OSTEOARTHRITIS OF BOTH HANDS: ICD-10-CM

## 2019-12-16 DIAGNOSIS — Z00.00 ROUTINE GENERAL MEDICAL EXAMINATION AT A HEALTH CARE FACILITY: Primary | ICD-10-CM

## 2019-12-16 DIAGNOSIS — Z98.890 HISTORY OF LUMBAR SURGERY: Primary | ICD-10-CM

## 2019-12-16 PROCEDURE — 99214 OFFICE O/P EST MOD 30 MIN: CPT | Performed by: FAMILY MEDICINE

## 2019-12-16 PROCEDURE — 99024 POSTOP FOLLOW-UP VISIT: CPT | Performed by: ORTHOPAEDIC SURGERY

## 2019-12-16 PROCEDURE — G0463 HOSPITAL OUTPT CLINIC VISIT: HCPCS

## 2019-12-16 RX ORDER — ETODOLAC 500 MG
500 TABLET ORAL 2 TIMES DAILY
Qty: 60 TABLET | Refills: 5 | Status: SHIPPED | OUTPATIENT
Start: 2019-12-16 | End: 2021-10-08

## 2019-12-16 ASSESSMENT — MIFFLIN-ST. JEOR: SCORE: 1745.14

## 2019-12-16 ASSESSMENT — PAIN SCALES - GENERAL: PAINLEVEL: EXTREME PAIN (8)

## 2019-12-16 NOTE — NURSING NOTE
Patient  presenting today for medication management.  Medication Reconciliation: complete    Siomara Palacios LPN  12/16/2019 4:37 PM

## 2019-12-17 PROBLEM — M54.2 NECK PAIN: Status: ACTIVE | Noted: 2019-12-17

## 2019-12-17 PROBLEM — M19.042 PRIMARY OSTEOARTHRITIS OF BOTH HANDS: Status: ACTIVE | Noted: 2019-12-17

## 2019-12-17 PROBLEM — M19.041 PRIMARY OSTEOARTHRITIS OF BOTH HANDS: Status: ACTIVE | Noted: 2019-12-17

## 2019-12-17 ASSESSMENT — ENCOUNTER SYMPTOMS
ARTHRALGIAS: 1
FEVER: 0
PSYCHIATRIC NEGATIVE: 1
CHILLS: 0
EYES NEGATIVE: 1
RESPIRATORY NEGATIVE: 1
NECK STIFFNESS: 1
JOINT SWELLING: 1
MYALGIAS: 1
DIZZINESS: 0
BACK PAIN: 1
NECK PAIN: 1

## 2019-12-17 ASSESSMENT — ASTHMA QUESTIONNAIRES: ACT_TOTALSCORE: 18

## 2019-12-17 NOTE — PROGRESS NOTES
"  SUBJECTIVE:   Job Sloan is a 43 year old male who presents to clinic today for the following health issues:Discuss medication     Patient arrives here to discuss medication.  Patient is wanting to get back on his oxycodone.  States he mainly uses it for right leg sciatica.  He states that he has had 2 back surgeries in the past.  States also he has been to 2 chronic pain clinics in the past and they have had him on oxycodone.  Also is complaining of pain involving his hands and knuckles.  His neck pain.  He states that he has had an MRI of the neck and they have recommended surgery.  States he feels like he is \"88 years old\".  States is been on Percocet all the time because of his right leg pain.   was reviewed showing oxycodone back in August and then more recently for his knee in November..  He also reports back pain.  Left shoulder pain.  Muscle spasms going up and down his back.  He also reports pain involving the left axilla.  As far as his knee he reports surgery went well.  Recently moved to the area and is looking to establish care.  Chart was reviewed.  I do not see a chronic pain consult in the chart.  I do see report of an MRI being done of his cervical spine showing mild disc protrusion.        Patient Active Problem List    Diagnosis Date Noted     Primary osteoarthritis of both hands 12/17/2019     Priority: Medium     Neck pain 12/17/2019     Priority: Medium     Complex tear of medial meniscus of right knee as current injury 11/20/2019     Priority: Medium     History of lumbar surgery times 3 11/20/2019     Priority: Medium     Mild intermittent asthma without complication 11/20/2019     Priority: Medium     Tobacco abuse 11/20/2019     Priority: Medium     Hyperlipidemia with target LDL less than 160 08/11/2015     Priority: Medium     Bilateral carpal tunnel syndrome 08/11/2015     Priority: Medium     History reviewed. No pertinent past medical history.   History reviewed. No pertinent " "surgical history.  Current Outpatient Medications   Medication Sig Dispense Refill     etodolac (LODINE) 500 MG tablet Take 1 tablet (500 mg) by mouth 2 times daily 60 tablet 5     Ipratropium-Albuterol (COMBIVENT RESPIMAT)  MCG/ACT inhaler Inhale 1 puff into the lungs       gabapentin (GRALISE) 300 MG 24hr Ext Release tab Take 300 mg by mouth 2 times daily       Allergies   Allergen Reactions     Hydrocodone-Acetaminophen Hives and Itching       Review of Systems   Constitutional: Negative for chills and fever.   Eyes: Negative.    Respiratory: Negative.    Cardiovascular: Negative for chest pain.   Genitourinary: Negative.    Musculoskeletal: Positive for arthralgias, back pain, joint swelling, myalgias, neck pain and neck stiffness.   Neurological: Negative for dizziness.   Psychiatric/Behavioral: Negative.         OBJECTIVE:     /80   Pulse 77   Temp 97.3  F (36.3  C)   Resp 12   Ht 1.816 m (5' 11.5\")   Wt 83 kg (183 lb)   SpO2 93%   BMI 25.17 kg/m     Body mass index is 25.17 kg/m .  Physical Exam  Constitutional:       Comments: Patient initially sitting in the quietly.  But once getting up on the examining table constantly jerking his upper extremities and lower extremities complaining of pain.  Simple light touch is will make him jerk.  He does have some Heberden's nodes.  His MP joints are slightly swollen consistent with degenerative joint disease.  Decreased range of motion the upper extremities especially the left.  Lungs were clear   HENT:      Head: Normocephalic.      Mouth/Throat:      Mouth: Mucous membranes are moist.   Cardiovascular:      Rate and Rhythm: Normal rate and regular rhythm.   Pulmonary:      Effort: Pulmonary effort is normal.      Breath sounds: Normal breath sounds.   Musculoskeletal: Normal range of motion.   Skin:     General: Skin is warm.   Neurological:      General: No focal deficit present.      Mental Status: He is alert.   Psychiatric:         Mood and " Affect: Mood normal.         Diagnostic Test Results:  none     ASSESSMENT/PLAN:         1. History of lumbar surgery      - etodolac (LODINE) 500 MG tablet; Take 1 tablet (500 mg) by mouth 2 times daily  Dispense: 60 tablet; Refill: 5    2. Primary osteoarthritis of both hands        3. Neck pain     was reviewed showing very little light oxycodone except in August and November.  Advised patient I would like to see his to chronic pain consults.  At this time I did not feel opiates are appropriate we should treat his degenerative changes with nonsteroidals.  Will start Lodine.  Also encouraged increasing activity.   patient is advised to sign a release of information.  25 to 30 minutes spent with the patient reviewing his medical records  with greater than 50% of the time.        Bienvenido Cintron MD  [facility]

## 2019-12-20 NOTE — PROGRESS NOTES
CHIEF COMPLAINT: Right Knee Arthroscopy Postop     PROBLEMS:   Patient has no noted problems.    PATIENT REPORTED MEDICATIONS:  IBUPROFEN TABLET (IBUPROFEN TABS)   GABAPENTIN CAPSULE   OXYCODONE HCL TABLET   PERCOCET 5-325 MG ORAL TABLET (OXYCODONE-ACETAMINOPHEN) Take 1 tablets by mouth every 8 hours as needed for pain; Route: ORAL    PATIENT REPORTED ALLERGIES:  VICODIN (SevereReaction: No reaction details noted)      HISTORY OF PRESENT ILLNESS:    Job is now 10 days status post right knee arthroscopy, partial medial meniscectomy.  He is doing very well.  States that he still has a little bit of a limp and his knee does not feel 100%, but he is getting there now that he is two weeks out.      PAST MEDICAL HISTORY:    Arthritis    PAST ORTHOPEDIC SURGICAL HISTORY:    Right Knee Arthroscopy, Partial Medial Meniscus Resection 12/06/19  Back x3  Carpal Tunnel Release     PAST SURGICAL HISTORY:    No Past Surgical History    FAMILY HISTORY:    Father:  Diabetes  Mother:  Back Surgery  Brother:  Neck Surgery  Sister:  Back Surgery    SOCIAL HISTORY:   Occupation:  [employer]  Marital Status:  Single  Alcohol Use:  No  Tobacco Use:  Yes, Does Not Want To Quit  Secondhand Smoke Exposure:  No  History of HIV:  No  History of Hepatitis:  No    PHYSICAL EXAMINATION:    His he sutures were removed today.  He does, in fact, walk with a bit of a limp.  The pain is more on the lateral side of his knee than the medial side, however.  He is otherwise doing fine and his wounds are completely benign.     ASSESSMENT:    Status post right knee arthroscopy, partial medial meniscectomy.     PLAN:   We are going to see him back on an as-needed basis for this.      Dictated by:  Cruz Hahn MD  Copy to:  Dr. Hahn @ [facility]    D:  12/16/19  T:  12/20/19    Typed and/or reviewed and corrected by signing  below, and sent to the Physician for final review and signature.      This report was created using voice  recording software and computer-generated templates. Although every effort has been made to review for and eliminate errors, some errors may still occur.     Electronically signed by Edna Ayala on 12/20/2019 at 10:07 AM    ________________________________________________________________________

## 2020-01-08 PROBLEM — M47.22 OSTEOARTHRITIS OF SPINE WITH RADICULOPATHY, CERVICAL REGION: Status: ACTIVE | Noted: 2017-12-09

## 2020-01-09 ENCOUNTER — OFFICE VISIT (OUTPATIENT)
Dept: FAMILY MEDICINE | Facility: OTHER | Age: 45
End: 2020-01-09
Attending: FAMILY MEDICINE
Payer: COMMERCIAL

## 2020-01-09 VITALS
RESPIRATION RATE: 20 BRPM | BODY MASS INDEX: 24.49 KG/M2 | WEIGHT: 180.8 LBS | HEIGHT: 72 IN | SYSTOLIC BLOOD PRESSURE: 112 MMHG | DIASTOLIC BLOOD PRESSURE: 74 MMHG | TEMPERATURE: 98.6 F | HEART RATE: 71 BPM | OXYGEN SATURATION: 98 %

## 2020-01-09 DIAGNOSIS — R89.2 ABNORMAL DRUG SCREEN: ICD-10-CM

## 2020-01-09 DIAGNOSIS — M54.50 CHRONIC RIGHT-SIDED LOW BACK PAIN WITHOUT SCIATICA: ICD-10-CM

## 2020-01-09 DIAGNOSIS — G89.29 CHRONIC RIGHT-SIDED LOW BACK PAIN WITHOUT SCIATICA: ICD-10-CM

## 2020-01-09 DIAGNOSIS — Z98.890 HISTORY OF LUMBAR SURGERY: ICD-10-CM

## 2020-01-09 DIAGNOSIS — M54.2 NECK PAIN: Primary | ICD-10-CM

## 2020-01-09 PROCEDURE — 99214 OFFICE O/P EST MOD 30 MIN: CPT | Performed by: FAMILY MEDICINE

## 2020-01-09 PROCEDURE — G0463 HOSPITAL OUTPT CLINIC VISIT: HCPCS

## 2020-01-09 RX ORDER — DULOXETIN HYDROCHLORIDE 30 MG/1
30 CAPSULE, DELAYED RELEASE ORAL 2 TIMES DAILY
Qty: 60 CAPSULE | Refills: 4 | Status: SHIPPED | OUTPATIENT
Start: 2020-01-09 | End: 2022-02-25

## 2020-01-09 ASSESSMENT — PAIN SCALES - GENERAL: PAINLEVEL: SEVERE PAIN (7)

## 2020-01-09 ASSESSMENT — MIFFLIN-ST. JEOR: SCORE: 1730.16

## 2020-01-09 ASSESSMENT — PATIENT HEALTH QUESTIONNAIRE - PHQ9: SUM OF ALL RESPONSES TO PHQ QUESTIONS 1-9: 0

## 2020-01-09 NOTE — LETTER
[facility]  [address]  [place] [address]  Phone: [phone]  Fax: [phone]    January 9, 2020        Job Sloan  [address]  [place] [address]          To whom it may concern:    RE: Job Sloan    Patient was seen and treated today at our clinic.  Patient may return to work     Please contact me for questions or concerns.      Sincerely,        Bienvenido Cintron MD

## 2020-01-10 PROBLEM — R89.2 ABNORMAL DRUG SCREEN: Status: ACTIVE | Noted: 2020-01-10

## 2020-01-10 ASSESSMENT — ENCOUNTER SYMPTOMS
ARTHRALGIAS: 1
NECK PAIN: 1
MYALGIAS: 1
NECK STIFFNESS: 1
BACK PAIN: 1

## 2020-01-10 NOTE — PROGRESS NOTES
Follow-up pain  SUBJECTIVE:   Job Sloan is a 44 year old male who presents to clinic today for the following health issues: Follow-up pain work release    Patient arrives here for follow-up pain.  He states he did not get any improvement with the Lodine that was prescribed previously previously.He continues to have back pain neck pain.  Patient is also requesting a refill of his patient indicates and very insistent on getting back on his Percocet.  He is also status post knee repair and request a work slip to get back to work.  States he is ready.  He typically washes dishes.  Patient has a history of multiple number relieved his pain..  Including back carpal tunnel.   I did spend an excess amount of time reviewing his chart and it seemed that he never did get relief from his surgeries.  Last time he was seen he did sign a release of information to get his chronic pain consult available.  We still have not received that consult.  In reviewing his chart I did notice on 12/9/2017 and note indicating patient had been to the [facility] on 9/27/2017 and was not a candidate for narcotic medications pitted.  His urine drug screen was positive for THC methamphetamine amphetamine methadone and oxycodone and buprenorphine at [facility] ED on 11 1317.        Patient Active Problem List    Diagnosis Date Noted     Primary osteoarthritis of both hands 12/17/2019     Priority: Medium     Neck pain 12/17/2019     Priority: Medium     Complex tear of medial meniscus of right knee as current injury 11/20/2019     Priority: Medium     History of lumbar surgery times 3 11/20/2019     Priority: Medium     Mild intermittent asthma without complication 11/20/2019     Priority: Medium     Tobacco abuse 11/20/2019     Priority: Medium     Osteoarthritis of spine with radiculopathy, cervical region 12/09/2017     Priority: Medium     Hyperlipidemia with target LDL less than 160 08/11/2015     Priority: Medium     Carpal  "tunnel syndrome of right wrist 08/11/2015     Priority: Medium     Right low back pain 07/28/2015     Priority: Medium     Bilateral carpal tunnel syndrome 07/28/2015     Priority: Medium     No past medical history on file.   No past surgical history on file.  Social History     Social History Narrative     Not on file     Current Outpatient Medications   Medication Sig Dispense Refill     DULoxetine (CYMBALTA) 30 MG capsule Take 1 capsule (30 mg) by mouth 2 times daily 60 capsule 4     etodolac (LODINE) 500 MG tablet Take 1 tablet (500 mg) by mouth 2 times daily 60 tablet 5     gabapentin (GRALISE) 300 MG 24hr Ext Release tab Take 300 mg by mouth 2 times daily       Ipratropium-Albuterol (COMBIVENT RESPIMAT)  MCG/ACT inhaler Inhale 1 puff into the lungs       Allergies   Allergen Reactions     Hydrocodone-Acetaminophen Hives and Itching       Review of Systems   Musculoskeletal: Positive for arthralgias, back pain, myalgias, neck pain and neck stiffness.        OBJECTIVE:     /74   Pulse 71   Temp 98.6  F (37  C)   Resp 20   Ht 1.816 m (5' 11.5\")   Wt 82 kg (180 lb 12.8 oz)   SpO2 98%   BMI 24.87 kg/m    Body mass index is 24.87 kg/m .  Physical Exam  HENT:      Head: Normocephalic and atraumatic.      Nose: Nose normal.   Pulmonary:      Effort: Pulmonary effort is normal.   Skin:     General: Skin is warm.   Neurological:      Mental Status: He is alert.   Psychiatric:         Mood and Affect: Mood normal.      Comments: During exam very insistent on getting Percocet and pain medication         Diagnostic Test Results:  none     ASSESSMENT/PLAN:         1. Neck pain  Until prior to a more extensive review of his chart I advised patient I felt that prior to getting his chronic pain consult we should avoid Percocet.  Patient then switched to [facility] wanting the medication if he could not get the Percocet.  Advised him I would start him on duloxetine.  He did not seem very receptive to this.  - " DULoxetine (CYMBALTA) 30 MG capsule; Take 1 capsule (30 mg) by mouth 2 times daily  Dispense: 60 capsule; Refill: 4    2. Chronic right-sided low back pain without sciatica      3. History of lumbar surgery times 3      4. Abnormal drug screen  I did spend quite a bit of time reviewing his chart and note from 12/9/2017 indicates abnormal drug testing in the past.  Although I could not find the chronic pain consultation there is a note that patient is not a candidate for narcotic medications.  Advised patient to again supply a release of information.  For his chronic pain consultation.  With his history it is very likely he will need to go to a chronic pain clinic to get all narcotic medication.  35 to 40 minutes spent with the patient greater than 50% counseling coordinating care reviewing his medical illness chart.        Bienvenido Cintron MD  [facility]

## 2020-12-16 NOTE — ED TRIAGE NOTES
Patient reporting stretching wrong and hearing popping sound on right side rib cage with immediate pain. Patient reporting it hurts to take a deep breath. 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  Right medial frontal scalp [id number]

## 2021-10-08 ENCOUNTER — OFFICE VISIT (OUTPATIENT)
Dept: FAMILY MEDICINE | Facility: OTHER | Age: 46
End: 2021-10-08
Attending: PHYSICIAN ASSISTANT
Payer: COMMERCIAL

## 2021-10-08 VITALS
WEIGHT: 176 LBS | BODY MASS INDEX: 24.2 KG/M2 | HEART RATE: 84 BPM | DIASTOLIC BLOOD PRESSURE: 86 MMHG | OXYGEN SATURATION: 96 % | TEMPERATURE: 98.5 F | SYSTOLIC BLOOD PRESSURE: 140 MMHG

## 2021-10-08 DIAGNOSIS — M50.30 BULGING OF CERVICAL INTERVERTEBRAL DISC: ICD-10-CM

## 2021-10-08 DIAGNOSIS — R20.0 NUMBNESS AND TINGLING IN BOTH HANDS: Primary | ICD-10-CM

## 2021-10-08 DIAGNOSIS — M79.602 PAIN IN BOTH UPPER EXTREMITIES: ICD-10-CM

## 2021-10-08 DIAGNOSIS — M62.838 MUSCLE SPASM OF LEFT SHOULDER AREA: ICD-10-CM

## 2021-10-08 DIAGNOSIS — M79.601 PAIN IN BOTH UPPER EXTREMITIES: ICD-10-CM

## 2021-10-08 DIAGNOSIS — H54.3 DECREASED VISION IN BOTH EYES: ICD-10-CM

## 2021-10-08 DIAGNOSIS — R20.2 NUMBNESS AND TINGLING IN BOTH HANDS: Primary | ICD-10-CM

## 2021-10-08 PROBLEM — R25.2 JERKING MOVEMENTS OF EXTREMITIES: Status: ACTIVE | Noted: 2021-09-16

## 2021-10-08 PROCEDURE — G0463 HOSPITAL OUTPT CLINIC VISIT: HCPCS

## 2021-10-08 PROCEDURE — 99214 OFFICE O/P EST MOD 30 MIN: CPT | Performed by: PHYSICIAN ASSISTANT

## 2021-10-08 RX ORDER — PREGABALIN 75 MG/1
75 CAPSULE ORAL 3 TIMES DAILY
COMMUNITY
Start: 2021-10-08 | End: 2022-02-25

## 2021-10-08 RX ORDER — TIZANIDINE 2 MG/1
2-4 TABLET ORAL 3 TIMES DAILY PRN
Qty: 30 TABLET | Refills: 2 | Status: SHIPPED | OUTPATIENT
Start: 2021-10-08 | End: 2022-02-25

## 2021-10-08 RX ORDER — BACLOFEN 10 MG/1
10 TABLET ORAL
COMMUNITY
Start: 2021-08-02 | End: 2021-10-08

## 2021-10-08 RX ORDER — PREGABALIN 75 MG/1
75 CAPSULE ORAL AT BEDTIME
COMMUNITY
Start: 2021-10-08 | End: 2021-10-08

## 2021-10-08 RX ORDER — PREGABALIN 75 MG/1
75 CAPSULE ORAL
COMMUNITY
Start: 2021-10-03 | End: 2021-10-08

## 2021-10-08 RX ORDER — MELOXICAM 15 MG/1
15 TABLET ORAL DAILY
Qty: 30 TABLET | Refills: 2 | Status: SHIPPED | OUTPATIENT
Start: 2021-10-08 | End: 2022-02-25

## 2021-10-08 ASSESSMENT — PAIN SCALES - GENERAL: PAINLEVEL: EXTREME PAIN (8)

## 2021-10-08 NOTE — PROGRESS NOTES
Nursing Notes:   Lisa Lama LPN  10/8/2021  2:25 PM  Signed  Chief Complaint   Patient presents with     Pain     neck, back and L arm      Here today to request a referral to surgeon and to request pain medication. Complains of L arm, neck and back pain. Recently seen at [facility]. Has MRI results.     Medication Reconciliation: complete    Lisa Lama LPN        HPI:    Job Sloan is a 45 year old male who presents for pain concerns.  Patient has history of chronic radicular cervical pain, pain in both upper extremities, jerking movements of upper extremities, bulging of cervical intervertebral disc, muscle spasm of the left shoulder area, and numbness and tingling in both hands.  Patient has pain in his neck, back, and left arm.  Interested in getting a referral to surgeon and to request pain medication. Complains of L arm, neck and back pain. Recently seen at [facility]. Has MRI results.  Patient has history of chronic radicular cervical pain, pain in both upper extremities, jerking movements of extremities, bulging of cervical intervertebral disc, muscle spasm of the left shoulder area, and numbness and tingling of both hands per his most recent appointment.  Patient was most recently seen anterior on 9/16/2021.  Per that note he had his back go out approximately 4 days prior.  Patient has history of seeing Dr. Zapata in pain management.  It was recommended conservative care upon review of the previous notes.  Per previous notes patient does have an neurology appointment on 11/29/2021 and a physical medicine and rehab pain management appointment on 11/1/2021.  Patient has history of taking baclofen, gabapentin.  Patient has been told in the past that narcotic medications are not appropriate.   Works as a . Hard time sitting still during the exam.  Patient states that he has a pinching pain in his left upper armpit and upper arm.  Radiates down his arm.  Feels like he has a pinched nerve in  his neck.  Has been in pain over the last year.  Trying to get it to go away.  Now feels like his right arm is starting to take a toll.  Steady headache over the last year.  History of migraines in the past.  Per the patient he has been to a different doctors trying to get into surgery.  Left-handed.  Wants to be able to go back to work.  Has jerking movements and hard time bending his left arm.  No recent trauma or injury.  Pain in his left neck that radiates to left armpit.  Feels like there is a knotted ball on the left side of his neck.  Is using heating pads and wraps his left arm and neck.  Pain is worse with laying down in his bed.  Has a hard time sleeping.  History of 3 back surgeries in the past.  He is able to raise his arm up and finds this most comfortable when he is sleeping.  Sleeps with his left arm up.  Started Lyrica approximately 2 days ago.  History of taking gabapentin however this causes him to be restless and did not help.  Occasionally will take gabapentin 1:59 at night.  Currently using aspirin and ibuprofen.  Last physical therapy session was in February.  Patient goes to [employer] and stretches and works out.  Does not use ice.  Reviewed through patient's previous notes, most recently dated on 9/16/2021.    History reviewed. No pertinent past medical history.    History reviewed. No pertinent surgical history.    History reviewed. No pertinent family history.    Social History     Tobacco Use     Smoking status: Current Every Day Smoker     Packs/day: 0.25     Smokeless tobacco: Never Used   Substance Use Topics     Alcohol use: No       Current Outpatient Medications   Medication Sig Dispense Refill     gabapentin (GRALISE) 300 MG 24hr Ext Release tab Take 300 mg by mouth 2 times daily       meloxicam (MOBIC) 15 MG tablet Take 1 tablet (15 mg) by mouth daily 30 tablet 2     pregabalin (LYRICA) 75 MG capsule Take 1 capsule (75 mg) by mouth 3 times daily       tiZANidine (ZANAFLEX) 2 MG tablet  Take 1-2 tablets (2-4 mg) by mouth 3 times daily as needed for muscle spasms 30 tablet 2     DULoxetine (CYMBALTA) 30 MG capsule Take 1 capsule (30 mg) by mouth 2 times daily (Patient not taking: Reported on 10/8/2021) 60 capsule 4     Ipratropium-Albuterol (COMBIVENT RESPIMAT)  MCG/ACT inhaler Inhale 1 puff into the lungs (Patient not taking: Reported on 10/8/2021)         Allergies   Allergen Reactions     Hydrocodone-Acetaminophen Hives and Itching       REVIEW OFSYSTEMS:  Refer to HPI.    EXAM:   Vitals:    BP (!) 140/86 (BP Location: Right arm, Patient Position: Sitting, Cuff Size: Adult Regular)   Pulse 84   Temp 98.5  F (36.9  C) (Tympanic)   Wt 79.8 kg (176 lb)   SpO2 96%   BMI 24.20 kg/m    General Appearance: Pleasant, alert, appropriate appearance for age. No acute distress  Chest/Respiratory Exam: Normal chest wall and respirations. Clear to auscultation.  Cardiovascular Exam: Regular rate and rhythm. S1, S2, no murmur, click, gallop, or rubs.  Musculoskeletal Exam: Back is straight, full ROM of upper and lower extremities.  Pain with palpation throughout the left shoulder, greatest inferior to the shoulder in the armpit.  No bruising or swelling appreciated.  Pain with left shoulder abduction, internal and external rotation.  No bruising or swelling appreciated.  Skin: no rash or abnormalities  Neurologic Exam: Nonfocal, symmetric DTRs, normal gross motor, tone coordination and no tremor.  Psychiatric Exam: Alert and oriented - appropriate affect.    PHQ Depression Screen  PHQ-9 SCORE 1/9/2020   PHQ-9 Total Score 0       ASSESSMENT AND PLAN:      ICD-10-CM    1. Numbness and tingling in both hands  R20.0 meloxicam (MOBIC) 15 MG tablet    R20.2 tiZANidine (ZANAFLEX) 2 MG tablet     Orthopedic  Referral   2. Pain in both upper extremities  M79.601 meloxicam (MOBIC) 15 MG tablet    M79.602 tiZANidine (ZANAFLEX) 2 MG tablet     Orthopedic  Referral   3. Bulging of cervical  intervertebral disc  M50.20 meloxicam (MOBIC) 15 MG tablet     tiZANidine (ZANAFLEX) 2 MG tablet     Orthopedic  Referral   4. Muscle spasm of left shoulder area  M62.838 meloxicam (MOBIC) 15 MG tablet     tiZANidine (ZANAFLEX) 2 MG tablet     Orthopedic  Referral   5. Decreased vision in both eyes  H54.3 Adult Eye Referral     Continue medications as previously prescribed by his primary care provider.    Discussed symptoms at length with the patient.  Reviewed previous notes from his primary care provider.    Encouraged to take meloxicam 15 mg daily.  Gave side effect profile.  Do not take with ibuprofen. Can take tylenol (1000 mg) up to 4 times per day as needed with the meloxicam.     Use tizanidine as needed for muscle spasms.  Gave side effect profile.    Encouraged rest and elevation.  Encouraged to use ice or heat 15 minutes at a time several times per day to decrease pain. Follow up with primary care provider. Return to clinic with any change or worsening of symptoms.     [facility] Review       Value Time User    State PDMP site checked  Yes 10/8/2021  2:19 PM Char Henry PA-C          Patient Instructions   Encouraged to take meloxicam 15 mg daily. Do not take with ibuprofen. Can take tylenol (1000 mg) up to 4 times per day as needed with the meloxicam.     Use tizanidine as needed for muscle spasms.     Encouraged rest and elevation.  Encouraged to use ice or heat 15 minutes at a time several times per day to decrease pain. Follow up with primary care provider. Return to clinic with any change or worsening of symptoms.       31 minutes spent on the date of the encounter doing chart review, history and exam, documentation and further activities as noted above.        Char Henry PA-C ..................10/8/2021 2:18 PM

## 2021-10-08 NOTE — PATIENT INSTRUCTIONS
Encouraged to take meloxicam 15 mg daily. Do not take with ibuprofen. Can take tylenol (1000 mg) up to 4 times per day as needed with the meloxicam.     Use tizanidine as needed for muscle spasms.     Encouraged rest and elevation.  Encouraged to use ice or heat 15 minutes at a time several times per day to decrease pain. Follow up with primary care provider. Return to clinic with any change or worsening of symptoms.

## 2021-10-08 NOTE — NURSING NOTE
Chief Complaint   Patient presents with     Pain     neck, back and L arm      Here today to request a referral to surgeon and to request pain medication. Complains of L arm, neck and back pain. Recently seen at [facility]. Has MRI results.     Medication Reconciliation: complete    Lisa Lama, LPN

## 2022-02-25 ENCOUNTER — HOSPITAL ENCOUNTER (OUTPATIENT)
Dept: GENERAL RADIOLOGY | Facility: OTHER | Age: 47
End: 2022-02-25
Attending: FAMILY MEDICINE
Payer: COMMERCIAL

## 2022-02-25 ENCOUNTER — OFFICE VISIT (OUTPATIENT)
Dept: FAMILY MEDICINE | Facility: OTHER | Age: 47
End: 2022-02-25
Attending: FAMILY MEDICINE
Payer: COMMERCIAL

## 2022-02-25 VITALS
HEIGHT: 72 IN | WEIGHT: 200 LBS | TEMPERATURE: 98 F | RESPIRATION RATE: 16 BRPM | HEART RATE: 88 BPM | SYSTOLIC BLOOD PRESSURE: 130 MMHG | OXYGEN SATURATION: 98 % | BODY MASS INDEX: 27.09 KG/M2 | DIASTOLIC BLOOD PRESSURE: 84 MMHG

## 2022-02-25 DIAGNOSIS — M79.672 LEFT FOOT PAIN: ICD-10-CM

## 2022-02-25 DIAGNOSIS — M79.672 LEFT FOOT PAIN: Primary | ICD-10-CM

## 2022-02-25 PROCEDURE — G0463 HOSPITAL OUTPT CLINIC VISIT: HCPCS

## 2022-02-25 PROCEDURE — 99213 OFFICE O/P EST LOW 20 MIN: CPT | Performed by: FAMILY MEDICINE

## 2022-02-25 PROCEDURE — 73630 X-RAY EXAM OF FOOT: CPT | Mod: LT

## 2022-02-25 RX ORDER — PREDNISONE 20 MG/1
40 TABLET ORAL DAILY
Qty: 10 TABLET | Refills: 0 | Status: SHIPPED | OUTPATIENT
Start: 2022-02-25 | End: 2022-03-02

## 2022-02-25 ASSESSMENT — PAIN SCALES - GENERAL: PAINLEVEL: EXTREME PAIN (9)

## 2022-02-25 NOTE — NURSING NOTE
Patient presents today for pain in his left foot big toe.    Medication Reconciliation Complete    Felipa Ruiz LPN  2/25/2022 3:05 PM

## 2022-02-25 NOTE — PROGRESS NOTES
"SUBJECTIVE:  Job Sloan is a 46 year old male here for left foot pain.  He reports that he has had worsening left foot pain over the last few days.  Does not recall any trauma or injury that brought this on.  He feels that his foot is swollen and he has difficulty placing any weight on his foot.  He has no history of gout.  He has tried some heating pads at home that has helped his symptoms while he is using heat but his pain quickly returns.    ROS:    As above otherwise ROS is unremarkable.    OBJECTIVE:  /84   Pulse 88   Temp 98  F (36.7  C)   Resp 16   Ht 1.816 m (5' 11.5\")   Wt 90.7 kg (200 lb)   SpO2 98%   BMI 27.51 kg/m      EXAM:  General Appearance: Pleasant, alert, appropriate appearance for age. No acute distress  Musculoskeletal: He is diffusely tender especially along his first metatarsal, first MTP joint and first toe.  Some swelling is noted in this area as well.  Skin: No erythema or warmth.  Neurologic Exam: Normal distal sensation light touch.    ASSESSEMENT AND PLAN:    1. Left foot pain      X-ray of his foot was performed, personally reviewed and shows degenerative changes first MTP joint without any sign of acute fracture or injury.  His symptoms are certainly consistent with gout.  At this time given the amount of pain he is in we will treat with prednisone 40 mg daily for 5 days.  If he is having no significant provement over the next week he will contact me for reassessment.    Jose Boss MD  Family Medicine  "

## 2022-05-04 ENCOUNTER — NURSE TRIAGE (OUTPATIENT)
Dept: FAMILY MEDICINE | Facility: OTHER | Age: 47
End: 2022-05-04
Payer: COMMERCIAL

## 2022-05-04 DIAGNOSIS — M79.672 LEFT FOOT PAIN: ICD-10-CM

## 2022-05-04 NOTE — TELEPHONE ENCOUNTER
Patient was prescribed prednisone by Dr. Boss on 2/25/22. Patient wants to get a refill?    Carla Rubio on 5/4/2022 at 3:27 PM

## 2022-05-04 NOTE — TELEPHONE ENCOUNTER
"Left message for patient to return call. Per last office visit plan:   \"At this time given the amount of pain he is in we will treat with prednisone 40 mg daily for 5 days.  If he is having no significant provement over the next week he will contact me for reassessment.\"  Laurie Lopez RN on 5/4/2022 at 3:43 PM    "

## 2022-05-10 RX ORDER — PREDNISONE 20 MG/1
40 TABLET ORAL DAILY
Qty: 10 TABLET | Refills: 0 | Status: CANCELLED
Start: 2022-05-10

## 2022-05-10 NOTE — TELEPHONE ENCOUNTER
3rd failed attempt to reach Pt.     Routing to Dr. Boss, for refill refusal/approval consideration. Judy Walker RN .............. 5/10/2022  10:40 AM

## 2022-05-10 NOTE — TELEPHONE ENCOUNTER
Noted. Writer will close encounter, and follow up as appropriate. Judy Walker RN .............. 5/10/2022  10:59 AM

## 2022-05-10 NOTE — TELEPHONE ENCOUNTER
No, if he is having recurrent pain he should be seen for reevaluation of his symptoms.  Recurrent dosing of prednisone is not appropriate.

## 2024-11-20 ENCOUNTER — HOSPITAL ENCOUNTER (EMERGENCY)
Facility: OTHER | Age: 49
Discharge: HOME | End: 2024-11-21
Payer: COMMERCIAL

## 2024-11-20 ENCOUNTER — APPOINTMENT (OUTPATIENT)
Dept: GENERAL RADIOLOGY | Facility: OTHER | Age: 49
End: 2024-11-20
Payer: COMMERCIAL

## 2024-11-20 VITALS
WEIGHT: 200 LBS | RESPIRATION RATE: 18 BRPM | BODY MASS INDEX: 27.09 KG/M2 | OXYGEN SATURATION: 99 % | HEART RATE: 66 BPM | SYSTOLIC BLOOD PRESSURE: 116 MMHG | HEIGHT: 72 IN | DIASTOLIC BLOOD PRESSURE: 74 MMHG

## 2024-11-20 DIAGNOSIS — M25.562 LEFT KNEE PAIN: ICD-10-CM

## 2024-11-20 PROCEDURE — 73562 X-RAY EXAM OF KNEE 3: CPT | Mod: TC,LT

## 2024-11-20 PROCEDURE — 99284 EMERGENCY DEPT VISIT MOD MDM: CPT

## 2024-11-20 PROCEDURE — 250N000011 HC RX IP 250 OP 636

## 2024-11-20 PROCEDURE — 96372 THER/PROPH/DIAG INJ SC/IM: CPT

## 2024-11-20 PROCEDURE — 99283 EMERGENCY DEPT VISIT LOW MDM: CPT

## 2024-11-20 RX ORDER — KETOROLAC TROMETHAMINE 30 MG/ML
30 INJECTION, SOLUTION INTRAMUSCULAR; INTRAVENOUS ONCE
Status: COMPLETED | OUTPATIENT
Start: 2024-11-20 | End: 2024-11-20

## 2024-11-20 RX ADMIN — KETOROLAC TROMETHAMINE 30 MG: 30 INJECTION, SOLUTION INTRAMUSCULAR at 23:33

## 2024-11-20 ASSESSMENT — ACTIVITIES OF DAILY LIVING (ADL)
ADLS_ACUITY_SCORE: 0
ADLS_ACUITY_SCORE: 0

## 2024-11-20 ASSESSMENT — COLUMBIA-SUICIDE SEVERITY RATING SCALE - C-SSRS
2. HAVE YOU ACTUALLY HAD ANY THOUGHTS OF KILLING YOURSELF IN THE PAST MONTH?: NO
1. IN THE PAST MONTH, HAVE YOU WISHED YOU WERE DEAD OR WISHED YOU COULD GO TO SLEEP AND NOT WAKE UP?: NO
6. HAVE YOU EVER DONE ANYTHING, STARTED TO DO ANYTHING, OR PREPARED TO DO ANYTHING TO END YOUR LIFE?: NO

## 2024-11-21 NOTE — ED PROVIDER NOTES
History     Chief Complaint   Patient presents with    Knee Pain     The history is provided by the patient and medical records.     Job Sloan is a 48 year old male who presents to the ER today complaining of left knee pain for 2 weeks. Denies trauma, fall, or injury. Pain is medial aspect of knee, worse with ambulating, flexion. No fevers, redness, warmth. Reports history of arthritis. Has been taking OTC medications at home without much relief, wears knee brace at work for stability.       Allergies:  Allergies   Allergen Reactions    Hydrocodone-Acetaminophen Hives and Itching       Problem List:    Patient Active Problem List    Diagnosis Date Noted    Pain in both upper extremities 09/16/2021     Priority: Medium    Numbness and tingling in both hands 09/16/2021     Priority: Medium    Muscle spasm of left shoulder area 09/16/2021     Priority: Medium    Jerking movements of extremities 09/16/2021     Priority: Medium    Abnormal drug axuiet30/13/2017 01/10/2020     Priority: Medium    Primary osteoarthritis of both hands 12/17/2019     Priority: Medium    Neck pain 12/17/2019     Priority: Medium    Complex tear of medial meniscus of right knee as current injury 11/20/2019     Priority: Medium    History of lumbar surgery times 3 11/20/2019     Priority: Medium    Mild intermittent asthma without complication 11/20/2019     Priority: Medium    Tobacco abuse 11/20/2019     Priority: Medium    Bulging of cervical intervertebral disc 12/09/2017     Priority: Medium    Hyperlipidemia with target LDL less than 160 08/11/2015     Priority: Medium    Carpal tunnel syndrome of right wrist 08/11/2015     Priority: Medium    Right low back pain 07/28/2015     Priority: Medium    Bilateral carpal tunnel syndrome 07/28/2015     Priority: Medium        Past Medical History:    No past medical history on file.    Past Surgical History:    No past surgical history on file.    Family History:    No family history on  file.    Social History:  Marital Status:  Single [1]  Social History     Tobacco Use    Smoking status: Every Day     Current packs/day: 0.25     Types: Cigarettes    Smokeless tobacco: Never   Vaping Use    Vaping status: Never Used   Substance Use Topics    Alcohol use: No    Drug use: Yes     Types: Marijuana     Comment: occasional  5 times a week         Medications:    No current outpatient medications on file.      Review of Systems   Musculoskeletal:         Left knee pain   All other systems reviewed and are negative.  See HPI    Physical Exam   BP: 116/74  Pulse: 66  Resp: 18  Height: 182.9 cm (6')  Weight: 90.7 kg (200 lb)  SpO2: 99 %      Physical Exam  Vitals and nursing note reviewed.   Constitutional:       General: He is not in acute distress.     Appearance: He is not ill-appearing or toxic-appearing.   Cardiovascular:      Rate and Rhythm: Normal rate and regular rhythm.      Pulses: Normal pulses.   Musculoskeletal:         General: No swelling or deformity.      Cervical back: Neck supple.      Left knee: No deformity, effusion, erythema, bony tenderness or crepitus. Tenderness present over the MCL.      Comments: Has full ROM, pain with ROM to medial aspect of knee. No calf or posterior knee pain   Skin:     General: Skin is warm.      Capillary Refill: Capillary refill takes less than 2 seconds.   Neurological:      General: No focal deficit present.      Mental Status: He is alert and oriented to person, place, and time.   Psychiatric:         Mood and Affect: Mood normal.         ED Course         Results for orders placed or performed during the hospital encounter of 11/20/24 (from the past 24 hours)   XR Knee Port Left 3 Views    Narrative    PROCEDURE INFORMATION:   Exam: XR Left Knee   Exam date and time: 11/20/2024 11:11 PM   Age: 48 years old   Clinical indication: Pain; Knee; Left; Additional info: Left knee pain     TECHNIQUE:   Imaging protocol: Radiologic exam of the left knee.    Views: 3 views.     COMPARISON:   CR Knee 10/7/2019 1:17 PM     FINDINGS:   Bones/joints: Normal. No fracture or dislocation.  Soft tissues: Normal.       Impression    IMPRESSION:   No acute findings.     If patient's pain continues recommend correlation with MRI as an outpatient.    THIS DOCUMENT HAS BEEN ELECTRONICALLY SIGNED BY SHRADDHA MEJIA MD       Medications   ketorolac (TORADOL) injection 30 mg (30 mg Intramuscular $Given 11/20/24 9151)       Assessments & Plan (with Medical Decision Making)  Job Sloan is a 48 year old male who presents to the ER today complaining of left knee pain for 2 weeks. Denies trauma, fall, or injury. Pain is medial aspect of knee, worse with ambulating, flexion. No fevers, redness, warmth. Reports history of arthritis. Has been taking OTC medications at home without much relief, wears knee brace at work for stability.   He is alert and oriented nondistressed stable.Vitally stable.   Left knee unremarkable for erythema, acute swelling, deformity.  He has full range of motion neurovascular status is intact.  Atraumatic medial knee pain with flexion and ambulation. No instability.   X-ray to left knee shows no acute findings. May be related to tendonitis, degenerative changes, bursitis     He is given IM Toradol here with some relief. Advised tylenol, ibuprofen at home, ice elevation.    follow-up with orthopedics in the clinic for evaluation as needed if pain continues, referral placed today. Patient discharged home instable conditon.      I have reviewed the nursing notes.    I have reviewed the findings, diagnosis, plan and need for follow up with the patient.    Medical Decision Making  The patient's presentation was of low complexity (an acute and uncomplicated illness or injury).    The patient's evaluation involved:  ordering and/or review of 1 test(s) in this encounter (see separate area of note for details)    The patient's management necessitated moderate risk  (prescription drug management including medications given in the ED).        There are no discharge medications for this patient.      Final diagnoses:   Left knee pain       11/20/2024   [facility], DEISY CNP  11/21/24 0024

## 2024-11-21 NOTE — DISCHARGE INSTRUCTIONS
Tylenol  (acetaminophen) 500-650 mg every 4-6 hours as needed (max daily 4000 mg)  Ibuprofen (motrin) 600 mg every 6 hours (with food) as needed (max daily 3200mg)- limit to only 5-7 days  Ice, elevation.   If pain continues, orthopedic referral placed for further evaluation.   If worsening symptoms, please return.

## 2024-11-21 NOTE — ED TRIAGE NOTES
Pt has been having pain for about 2 weeks.  Pain is with movement.  Minimal swelling.  Pain is greater after work and use.  Has a history of Rt knee arthritis that he had surgery on already.       Triage Assessment (Adult)       Row Name 11/20/24 5838          Triage Assessment    Airway WDL WDL        Respiratory WDL    Respiratory WDL WDL        Skin Circulation/Temperature WDL    Skin Circulation/Temperature WDL WDL        Cardiac WDL    Cardiac WDL WDL     Cardiac Rhythm NSR        Peripheral/Neurovascular WDL    Peripheral Neurovascular WDL WDL        Cognitive/Neuro/Behavioral WDL    Cognitive/Neuro/Behavioral WDL WDL

## 2025-02-04 ENCOUNTER — OFFICE VISIT (OUTPATIENT)
Dept: FAMILY MEDICINE | Facility: OTHER | Age: 50
End: 2025-02-04
Payer: COMMERCIAL

## 2025-02-04 VITALS
OXYGEN SATURATION: 98 % | BODY MASS INDEX: 25.77 KG/M2 | SYSTOLIC BLOOD PRESSURE: 116 MMHG | HEART RATE: 64 BPM | WEIGHT: 190 LBS | RESPIRATION RATE: 16 BRPM | DIASTOLIC BLOOD PRESSURE: 82 MMHG

## 2025-02-04 DIAGNOSIS — M23.204 DEGENERATIVE TEAR OF MEDIAL MENISCUS OF LEFT KNEE: Primary | ICD-10-CM

## 2025-02-04 DIAGNOSIS — M17.12 PRIMARY OSTEOARTHRITIS OF LEFT KNEE: ICD-10-CM

## 2025-02-04 PROCEDURE — 250N000009 HC RX 250: Performed by: FAMILY MEDICINE

## 2025-02-04 PROCEDURE — 20610 DRAIN/INJ JOINT/BURSA W/O US: CPT | Performed by: FAMILY MEDICINE

## 2025-02-04 PROCEDURE — 250N000011 HC RX IP 250 OP 636: Mod: JW | Performed by: FAMILY MEDICINE

## 2025-02-04 PROCEDURE — G0463 HOSPITAL OUTPT CLINIC VISIT: HCPCS | Mod: 25

## 2025-02-04 RX ORDER — TRIAMCINOLONE ACETONIDE 40 MG/ML
40 INJECTION, SUSPENSION INTRA-ARTICULAR; INTRAMUSCULAR ONCE
Status: COMPLETED | OUTPATIENT
Start: 2025-02-04 | End: 2025-02-04

## 2025-02-04 RX ORDER — LIDOCAINE HYDROCHLORIDE 10 MG/ML
2 INJECTION, SOLUTION INFILTRATION; PERINEURAL ONCE
Status: COMPLETED | OUTPATIENT
Start: 2025-02-04 | End: 2025-02-04

## 2025-02-04 RX ORDER — ROSUVASTATIN CALCIUM 10 MG/1
1 TABLET, COATED ORAL DAILY
COMMUNITY
Start: 2024-09-09

## 2025-02-04 RX ORDER — BUPIVACAINE HYDROCHLORIDE 5 MG/ML
2 INJECTION, SOLUTION EPIDURAL; INTRACAUDAL ONCE
Status: COMPLETED | OUTPATIENT
Start: 2025-02-04 | End: 2025-02-04

## 2025-02-04 RX ORDER — TRAZODONE HYDROCHLORIDE 100 MG/1
TABLET ORAL
COMMUNITY
Start: 2024-08-07

## 2025-02-04 RX ORDER — BUPRENORPHINE AND NALOXONE 12; 3 MG/1; MG/1
1 FILM, SOLUBLE BUCCAL; SUBLINGUAL
COMMUNITY
Start: 2025-01-13 | End: 2025-04-13

## 2025-02-04 RX ADMIN — BUPIVACAINE HYDROCHLORIDE 10 MG: 5 INJECTION, SOLUTION EPIDURAL; INTRACAUDAL; PERINEURAL at 15:17

## 2025-02-04 RX ADMIN — TRIAMCINOLONE ACETONIDE 40 MG: 40 INJECTION, SUSPENSION INTRA-ARTICULAR; INTRAMUSCULAR at 15:17

## 2025-02-04 RX ADMIN — LIDOCAINE HYDROCHLORIDE 2 ML: 10 INJECTION, SOLUTION INFILTRATION; PERINEURAL at 15:17

## 2025-02-04 ASSESSMENT — PAIN SCALES - GENERAL: PAINLEVEL_OUTOF10: MODERATE PAIN (5)

## 2025-02-04 NOTE — PROGRESS NOTES
Sports Medicine Office Note    HPI:  49-year-old male coming in for evaluation of left knee pain.  His pain began 4 months ago without inciting event or injury.  He feels like his pain has progressively worsened.  He was seen in the ER on 11/20.  X-rays were performed and were negative.  Most of his pain is along the medial aspect of the knee.  He rates the pain at 6/10.  He characterizes the pain as aching and throbbing.  He has pain with most upright/weightbearing activities.  The more he is on his feet, the worse his pain symptoms are.  He has tried heat, ice, and over-the-counter medications.  He does wear a brace occasionally which provides a feeling of stability.  No previous injury or surgery to this knee.      EXAM:  /82 (BP Location: Right arm, Patient Position: Sitting, Cuff Size: Adult Regular)   Pulse 64   Resp 16   Wt 86.2 kg (190 lb)   SpO2 98%   BMI 25.77 kg/m    MUSCULOSKELETAL EXAM:  LEFT KNEE  Inspection:  -No gross deformity  -No bruising or soft tissue swelling  -Scars:  None    Tenderness to palpation of the:  -Quadriceps musculature:  Negative  -Quadriceps tendon:  Negative  -Patella:  Negative  -Medial patellar facet: Mild pain  -Lateral patellar facet: Mild pain  -Inferior pole of the patella:  Negative  -Patellar tendon:  Negative  -Tibial tuberosity:  Negative  -Medial joint line: Positive  -Medial collateral ligament:  Negative  -Medial hamstring tendons:  Negative  -Medial femoral condyle:  Negative  -Medial tibial plateau:  Negative  -Pes anserine bursa:  Negative  -Lateral joint line:  Negative  -Distal IT band:  Negative  -Gerdy's tubercle:  Negative  -Lateral collateral ligament:  Negative  -Lateral hamstring tendons:  Negative  -Lateral femoral condyle:  Negative  -Lateral tibial plateau:  Negative  -Posterior lateral corner:  Negative  -Popliteal fossa:  Negative    Range of Motion:  -Passive flexion:  135 with mild pain at endrange  -Passive extension:   0    Strength:  -Extension:  5/5  -Flexion:  5/5    Special Tests:  -Effusion:  Absent  -Medial patellar glide:  Negative  -Lateral patellar glide:  Negative  -Patellar apprehension:  Negative  -Medial Dave's: Weakly positive  -Lateral Dave's:  Negative  -Valgus stress:  Negative  -Varus stress:  Negative  -Lachman test:  Negative  -Anterior drawer:  Negative  -Posterior drawer:  Negative    Other:  -Intact sensation to light touch distally.  -No signs of cyanosis. Normal skin temperature of the lower extremity.  -Foot/ankle:  No gross deformity. Full range of motion.  -Right knee:  No gross deformity. No palpable tenderness. Normal strength and ROM.      IMAGIN2024: 3 view left knee x-ray  - No fracture, dislocation, or bony lesion.  Mild medial tibiofemoral compartment joint space narrowing.      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  X-rays from  were personally reviewed in the office with the findings as demonstrated above by my interpretation.  We reviewed treatment options for this condition to include aerobic activity, physical therapy, ice, topical medications, oral medications, injections, and surgery.  At this time he would like to avoid any surgical intervention.  After reviewing the risks/benefits, the patient elects to proceed with an intra-articular CSI.  See procedure note  below:    PROCEDURE:  Intraarticular Injection of the Left Knee     PROCEDURAL PAUSE:    A procedural pause was conducted to verify:  correct patient identity, procedure to be performed, and as applicable, correct side, site, and correct patient position.      INFORMED CONSENT:   I discussed the risks, possible benefits, and alternatives to injection.  Following denial of allergy and review of potential side effects and complications (including but not necessarily limited to infection, allergic reaction, fat necrosis, skin depigmentation, local tissue breakdown, systemic effects of corticosteroids, elevation of blood glucose, injury to soft tissue and/or nerves, and seizure), all questions were answered, and consent was given to proceed.  Patient verbalized understanding.      PROCEDURE DETAILS:    Side and site were marked, and a time out was performed to verify appropriate patient identifiers.  Following this the left knee, just superior to the tibial plateau and medial to the patellar tendon, was prepped with chlorhexidine.  Utilizing a 22-gauge needle the left intraarticular knee was injected using a anteromedial to posterolateral approach with 2mL of 1% Lidocaine, 2mL of 0.5% bupivacaine, and 1mL triamcinolone (40mg/mL).  0mL was wasted.      The patient tolerated the procedure without complication and was discharged in good condition after a short observation period.  The patient was instructed to contact me regarding any questions pertaining to the procedure.      DIAGNOSIS:    -Successful injection of the left intraarticular knee without immediate complication      PLAN:   -Post-procedure care reviewed, including avoiding submersion of the injection site for 48 hours   -Return precautions reviewed for signs/symptoms that would be concerning for infection   -The patient was instructed to ice and take APAP this evening if needed  -Discussed the benefits of a regular aerobic activity  -Offered a referral to  physical therapy, patient not interested at this time  -Return to clinic as needed      Rm Austin MD  2/4/2025  2:52 PM    Total time spent with this patient was 26 minutes which included chart review, visualization and independent interpretation of images, time spent with the patient, and documentation.    Procedure time:  3 minute(s)

## (undated) RX ORDER — TRIAMCINOLONE ACETONIDE 40 MG/ML
INJECTION, SUSPENSION INTRA-ARTICULAR; INTRAMUSCULAR
Status: DISPENSED
Start: 2025-02-04

## (undated) RX ORDER — KETOROLAC TROMETHAMINE 30 MG/ML
INJECTION, SOLUTION INTRAMUSCULAR; INTRAVENOUS
Status: DISPENSED
Start: 2024-11-20

## (undated) RX ORDER — TRAMADOL HYDROCHLORIDE 50 MG/1
TABLET ORAL
Status: DISPENSED
Start: 2019-07-31

## (undated) RX ORDER — BUPIVACAINE HYDROCHLORIDE 5 MG/ML
INJECTION, SOLUTION EPIDURAL; INTRACAUDAL
Status: DISPENSED
Start: 2025-02-04

## (undated) RX ORDER — LIDOCAINE HYDROCHLORIDE 10 MG/ML
INJECTION, SOLUTION INFILTRATION; PERINEURAL
Status: DISPENSED
Start: 2025-02-04